# Patient Record
Sex: MALE | Race: WHITE | Employment: OTHER | URBAN - NONMETROPOLITAN AREA
[De-identification: names, ages, dates, MRNs, and addresses within clinical notes are randomized per-mention and may not be internally consistent; named-entity substitution may affect disease eponyms.]

---

## 2018-04-03 ENCOUNTER — HOSPITAL ENCOUNTER (INPATIENT)
Facility: HOSPITAL | Age: 38
LOS: 6 days | Discharge: HOME | End: 2018-04-09
Attending: PHYSICIAN ASSISTANT | Admitting: PSYCHIATRY & NEUROLOGY
Payer: MEDICAID

## 2018-04-03 DIAGNOSIS — F41.9 ANXIETY: ICD-10-CM

## 2018-04-03 DIAGNOSIS — R45.851 SUICIDAL IDEATION: ICD-10-CM

## 2018-04-03 DIAGNOSIS — G47.9 SLEEP DISORDER: Primary | ICD-10-CM

## 2018-04-03 DIAGNOSIS — F17.200 NICOTINE DEPENDENCE, UNCOMPLICATED, UNSPECIFIED NICOTINE PRODUCT TYPE: ICD-10-CM

## 2018-04-03 LAB
ALBUMIN SERPL-MCNC: 4.1 G/DL (ref 3.4–5)
ALP SERPL-CCNC: 99 U/L (ref 40–150)
ALT SERPL W P-5'-P-CCNC: 73 U/L (ref 0–70)
ANION GAP SERPL CALCULATED.3IONS-SCNC: 10 MMOL/L (ref 3–14)
APAP SERPL-MCNC: <2 MG/L (ref 10–20)
AST SERPL W P-5'-P-CCNC: 56 U/L (ref 0–45)
BILIRUB SERPL-MCNC: 0.6 MG/DL (ref 0.2–1.3)
BUN SERPL-MCNC: 9 MG/DL (ref 7–30)
CALCIUM SERPL-MCNC: 9.1 MG/DL (ref 8.5–10.1)
CHLORIDE SERPL-SCNC: 103 MMOL/L (ref 94–109)
CO2 SERPL-SCNC: 25 MMOL/L (ref 20–32)
CREAT SERPL-MCNC: 0.78 MG/DL (ref 0.66–1.25)
ETHANOL SERPL-MCNC: <0.01 G/DL
GFR SERPL CREATININE-BSD FRML MDRD: >90 ML/MIN/1.7M2
GLUCOSE SERPL-MCNC: 173 MG/DL (ref 70–99)
LITHIUM SERPL-SCNC: <0.2 MMOL/L (ref 0.6–1.2)
POTASSIUM SERPL-SCNC: 3.8 MMOL/L (ref 3.4–5.3)
PROT SERPL-MCNC: 8.6 G/DL (ref 6.8–8.8)
SALICYLATES SERPL-MCNC: 3 MG/DL
SODIUM SERPL-SCNC: 138 MMOL/L (ref 133–144)

## 2018-04-03 PROCEDURE — 80178 ASSAY OF LITHIUM: CPT | Performed by: PHYSICIAN ASSISTANT

## 2018-04-03 PROCEDURE — 80329 ANALGESICS NON-OPIOID 1 OR 2: CPT | Performed by: PHYSICIAN ASSISTANT

## 2018-04-03 PROCEDURE — 99285 EMERGENCY DEPT VISIT HI MDM: CPT

## 2018-04-03 PROCEDURE — 80320 DRUG SCREEN QUANTALCOHOLS: CPT | Performed by: PHYSICIAN ASSISTANT

## 2018-04-03 PROCEDURE — 99284 EMERGENCY DEPT VISIT MOD MDM: CPT | Mod: Z6 | Performed by: PHYSICIAN ASSISTANT

## 2018-04-03 PROCEDURE — 12400000 ZZH R&B MH

## 2018-04-03 PROCEDURE — 80053 COMPREHEN METABOLIC PANEL: CPT | Performed by: PHYSICIAN ASSISTANT

## 2018-04-03 PROCEDURE — 25000132 ZZH RX MED GY IP 250 OP 250 PS 637: Performed by: NURSE PRACTITIONER

## 2018-04-03 PROCEDURE — 36415 COLL VENOUS BLD VENIPUNCTURE: CPT | Performed by: PHYSICIAN ASSISTANT

## 2018-04-03 RX ORDER — PROMETHAZINE HYDROCHLORIDE 25 MG/1
25 TABLET ORAL EVERY 6 HOURS PRN
Status: ON HOLD | COMMUNITY
End: 2018-04-29

## 2018-04-03 RX ORDER — LORATADINE 10 MG/1
10 TABLET ORAL DAILY
Status: ON HOLD | COMMUNITY
End: 2018-04-29

## 2018-04-03 RX ORDER — DIVALPROEX SODIUM 500 MG/1
1000 TABLET, EXTENDED RELEASE ORAL AT BEDTIME
Status: ON HOLD | COMMUNITY
End: 2018-04-09

## 2018-04-03 RX ORDER — OLANZAPINE 10 MG/1
10 TABLET ORAL 3 TIMES DAILY PRN
Status: DISCONTINUED | OUTPATIENT
Start: 2018-04-03 | End: 2018-04-09 | Stop reason: HOSPADM

## 2018-04-03 RX ORDER — FLUTICASONE PROPIONATE 50 MCG
1 SPRAY, SUSPENSION (ML) NASAL
Status: ON HOLD | COMMUNITY
End: 2018-04-29

## 2018-04-03 RX ORDER — ACETAMINOPHEN 325 MG/1
650 TABLET ORAL EVERY 4 HOURS PRN
Status: DISCONTINUED | OUTPATIENT
Start: 2018-04-03 | End: 2018-04-09 | Stop reason: HOSPADM

## 2018-04-03 RX ORDER — HYDROXYZINE HYDROCHLORIDE 25 MG/1
25-50 TABLET, FILM COATED ORAL EVERY 4 HOURS PRN
Status: DISCONTINUED | OUTPATIENT
Start: 2018-04-03 | End: 2018-04-09 | Stop reason: HOSPADM

## 2018-04-03 RX ORDER — ALUMINA, MAGNESIA, AND SIMETHICONE 2400; 2400; 240 MG/30ML; MG/30ML; MG/30ML
30 SUSPENSION ORAL EVERY 4 HOURS PRN
Status: DISCONTINUED | OUTPATIENT
Start: 2018-04-03 | End: 2018-04-09 | Stop reason: HOSPADM

## 2018-04-03 RX ORDER — LORAZEPAM 0.5 MG/1
0.5 TABLET ORAL 2 TIMES DAILY PRN
Status: ON HOLD | COMMUNITY
End: 2018-04-09

## 2018-04-03 RX ORDER — TRAZODONE HYDROCHLORIDE 50 MG/1
50 TABLET, FILM COATED ORAL
Status: DISCONTINUED | OUTPATIENT
Start: 2018-04-03 | End: 2018-04-09 | Stop reason: HOSPADM

## 2018-04-03 RX ORDER — OLANZAPINE 10 MG/2ML
10 INJECTION, POWDER, FOR SOLUTION INTRAMUSCULAR 3 TIMES DAILY PRN
Status: DISCONTINUED | OUTPATIENT
Start: 2018-04-03 | End: 2018-04-09 | Stop reason: HOSPADM

## 2018-04-03 RX ADMIN — ALUMINUM HYDROXIDE, MAGNESIUM HYDROXIDE, AND DIMETHICONE 30 ML: 400; 400; 40 SUSPENSION ORAL at 17:55

## 2018-04-03 RX ADMIN — OLANZAPINE 10 MG: 10 TABLET, FILM COATED ORAL at 17:55

## 2018-04-03 RX ADMIN — ACETAMINOPHEN 650 MG: 325 TABLET, FILM COATED ORAL at 17:55

## 2018-04-03 ASSESSMENT — ENCOUNTER SYMPTOMS
RESPIRATORY NEGATIVE: 1
CARDIOVASCULAR NEGATIVE: 1
CONSTITUTIONAL NEGATIVE: 1
NEUROLOGICAL NEGATIVE: 1
SLEEP DISTURBANCE: 1

## 2018-04-03 ASSESSMENT — ACTIVITIES OF DAILY LIVING (ADL)
TRANSFERRING: 0-->INDEPENDENT
RETIRED_EATING: 0-->INDEPENDENT
COGNITION: 1 - ATTENTION OR MEMORY DEFICITS
RETIRED_COMMUNICATION: 0-->UNDERSTANDS/COMMUNICATES WITHOUT DIFFICULTY
NUMBER_OF_TIMES_PATIENT_HAS_FALLEN_WITHIN_LAST_SIX_MONTHS: 2
AMBULATION: 0-->INDEPENDENT
BATHING: 0-->INDEPENDENT
TOILETING: 0-->INDEPENDENT
FALL_HISTORY_WITHIN_LAST_SIX_MONTHS: YES
DRESS: 0-->INDEPENDENT
SWALLOWING: 0-->SWALLOWS FOODS/LIQUIDS WITHOUT DIFFICULTY
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
## 2018-04-03 NOTE — ED NOTES
Patient cooperative, changed into scrubs clothing given to security and placed in locker.  Patient consents to safety Patients dad at bedside  Security at bedside
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
## 2018-04-03 NOTE — IP AVS SNAPSHOT
[facility]    [address] [place] [id number]    Phone:  [phone]    Fax:  [phone]                                       After Visit Summary   4/3/2018    Ar Irby III    MRN: [id number]           After Visit Summary Signature Page     I have received my discharge instructions, and my questions have been answered. I have discussed any challenges I see with this plan with the nurse or doctor.    ..........................................................................................................................................  Patient/Patient Representative Signature      ..........................................................................................................................................  Patient Representative Print Name and Relationship to Patient    ..................................................               ................................................  Date                                            Time    ..........................................................................................................................................  Reviewed by Signature/Title    ...................................................              ..............................................  Date                                                            Time

## 2018-04-03 NOTE — IP AVS SNAPSHOT
` `       Patient Information     Patient Name Sex     Ar Irby III ([id number]) Male 1980       Room Bed    568 568-1      Patient Demographics     Address Phone    [address]   [place] [address] [phone] (Home)      Patient Ethnicity & Race     Ethnic Group Patient Race    American White      Emergency Contact(s)     Name Relation Home Work Mobile    AR IRBY JR Father [phone]      NO SECONDARY CONTACT Other NONE        Documents on File        Status Date Received Description       Documents for the Patient    Insurance Card Not Received  DIDN'T HAVE    Patient ID   DIDN'T HAVE    Consent for EHR Access-Received-ESign       Consent for EHR Access-Decline-ESign       Consent for Services/Privacy Notice - Hospital/Clinic Received 18     Privacy Notice - Rule Received 18     Care Everywhere Prospective Auth Received 18     Consent for EHR Access Received 18        Documents for the Encounter    ED Notes - Emergency Department  18 DEC CRISIS ASSESSMENT    CMS IM for Patient Signature Received 18 Pt signed medicare letter    Business/Insurance/Care Coordination/Health Form - Encounter  18 EMERGENCY DEPARTMENT PATIENT WATCH      Admission Information     Attending Provider Admitting Provider Admission Type Admission Date/Time     Juvencio Howard MD Emergency 18  1445    Discharge Date Hospital Service Auth/Cert Status Service Area    18 Mental Health Incomplete [facility]    Unit Room/Bed Admission Status       [facility] 568/568-1 Discharged (Confirmed)       Admission     Complaint    Suicidal ideation      Hospital Account     Name Acct ID Class Status Primary Coverage    Ar Irby III [id number] Mental Health Discharged/[place]            Guarantor [place] (for Hospital Account #[id number])     Name Relation to Pt Service Area Active? Acct Type     Ar Irby III Self [facility]    Address Phone          [address] [place] [address] [phone](H)              Coverage Information (for Hospital Account #[id number])     F/O Payor/Plan Precert #    [place]     Subscriber Subscriber #    Ar Irby III [id number]    Address Phone    [address] [address]  [place] [address] [phone]

## 2018-04-03 NOTE — IP AVS SNAPSHOT
` `           [facility]: [phone]            Medication Administration Report for Ar Irby III as of 04/10/18 0953   Legend:    Given Hold Not Given Due Canceled Entry Other Actions    Time Time (Time) Time  Time-Action       Inactive    Active    Linked        Medications 04/03/18 04/04/18 04/05/18 04/06/18 04/07/18 04/08/18 04/09/18   Discontinued Medications      Dose: 650 mg  Freq: EVERY 4 HOURS PRN Route: PO  PRN Reason: mild pain  Start: 04/03/18 1748   End: 04/09/18 1708   Admin Instructions: Do not use if the patient has significant liver disease. MAX acetaminophen = 4000 mg/24 hrs.  MAX acetaminophen <3000 mg/24 hrs for patients > or = 65 years old.  Maximum acetaminophen dose from all sources = 75 mg/kg/day not to exceed 4 grams/day.    Admin. Amount: 2 tablet (2 × 325 mg tablet)  Last Admin: 04/09/18 1420  Dispense Loc: [facility] [id number]     1755 (650 mg)-Given        2223 (650 mg)-Given         0624 (650 mg)-Given       1625 (650 mg)-Given          1420 (650 mg)-Given       1708-Med Discontinued         Dose: 2 puff  Freq: EVERY 4 HOURS PRN Route: IN  PRN Reasons: wheezing,shortness of breath / dyspnea  Start: 04/04/18 1233   End: 04/09/18 1708   Admin. Amount: 2 puff  Dispense Loc: [facility]           1708-Med Discontinued         Dose: 30 mL  Freq: EVERY 4 HOURS PRN Route: PO  PRN Reason: indigestion  Start: 04/03/18 1748   End: 04/09/18 1708   Admin Instructions: Shake well.    Admin. Amount: 30 mL  Last Admin: 04/03/18 1755  Dispense [facility]  Volume: 30 mL     1755 (30 mL)-Given             1708-Med Discontinued         Dose: 150 mg  Freq: DAILY Route: PO  Start: 04/04/18 1245   End: 04/09/18 1708   Admin. Amount: 1 tablet (1 × 150 mg tablet)  Last Admin: 04/09/18 0819  Dispense Loc: [facility] [id number]      1318 (150 mg)-Given        0811 (150 mg)-Given        0818 (150 mg)-Given        0913 (150 mg)-Given        0857 (150 mg)-Given        0819 (150  mg)-Given       1708-Med Discontinued         Dose: 1 spray  Freq: DAILY Route: BOTH NOSTRIL  Start: 04/04/18 1245   End: 04/09/18 1708   Admin. Amount: 1 spray  Last Admin: 04/08/18 0906  Dispense Loc: [facility]      1317 (1 spray)-Given        (0810)-Not Given        (0817)-Not Given        (0917)-Not Given        0906 (1 spray)-Given        (0819)-Not Given       1708-Med Discontinued         Dose: 25-50 mg  Freq: EVERY 4 HOURS PRN Route: PO  PRN Reason: anxiety  Start: 04/03/18 1748   End: 04/09/18 1708   Admin. Amount: 1-2 tablet (1-2 × 25 mg tablet)  Last Admin: 04/09/18 1107  Dispense Loc: [facility] [id number]      1854 (25 mg)-Given         0821 (50 mg)-Given          1107 (50 mg)-Given       1708-Med Discontinued         Dose: 10 mg  Freq: DAILY Route: PO  Start: 04/04/18 1245   End: 04/09/18 1708   Admin. Amount: 1 tablet (1 × 10 mg tablet)  Last Admin: 04/09/18 0819  Dispense Loc: [facility] [id number]      1318 (10 mg)-Given        0811 (10 mg)-Given        0818 (10 mg)-Given        0914 (10 mg)-Given        0857 (10 mg)-Given        0819 (10 mg)-Given       1708-Med Discontinued         Dose: 30 mL  Freq: AT BEDTIME PRN Route: PO  PRN Reason: constipation  Start: 04/03/18 1748   End: 04/09/18 1708   Admin Instructions: Shake well.    Admin. Amount: 30 mL  Dispense Loc: [facility]  Volume: 30 mL           1708-Med Discontinued         Dose: 9 mg  Freq: AT BEDTIME PRN Route: PO  PRN Reason: sleep  Start: 04/04/18 1236   End: 04/09/18 1708   Admin. Amount: 3 tablet (3 × 3 mg tablet)  Dispense Loc: [facility] [id number]           1708-Med Discontinued         Dose: 1 patch  Freq: EVERY 24 HOURS Route: TD  Start: 04/04/18 1245   End: 04/09/18 1708   Admin. Amount: 1 patch  Last Admin: 04/04/18 1317  [id number]      1317 (1 patch)-Given        (0810)-Not Given        (0817)-Not Given        (0918)-Not Given [C]        (0857)-Not Given        (0819)-Not Given       1708-Med Discontinued          Freq: EVERY 8 HOURS Route: TD  Start: 04/04/18 1245   End: 04/09/18 1708   Admin Instructions: Chart every shift, confirming that patch is still in place on patient (no barcode scan needed). See patch order for dose information.    Last Admin: 04/09/18 1202  Dispense Loc: [facility]      1319 ( )-Patch in Place       2225 (1 each)-Patch Removed [C]        0358 ( )-Negative       1209 ( )-Negative               0405 ( )-Negative [C]       1152 ( )-Negative       2040 ( )-Patch in Place        0400-Hold       1205-Hold [C]       2036 ( )-Patch in Place        0431 ( )-Negative       1405 ( )-Patch in Place       1947 ( )-Patch in Place        0358 ( )-Patch in Place       1202 ( )-Negative       1708-Med Discontinued         Freq: DAILY Route: TD  Start: 04/05/18 0900   End: 04/09/18 1708   Admin Instructions: Remove patch when new patch is applied or patch is discontinued.    Dispense Loc: [facility]       0812 ( )-Patch Removed        0816 ( )-Patch Removed        0919-Hold [C]        0902 ( )-Patch Removed        0826-Hold       1708-Med Discontinued         Dose: 2-4 mg  Freq: EVERY 1 HOUR PRN Route: BU  PRN Reason: other  PRN Comment: nicotine withdrawal symptoms  Start: 04/03/18 1748   End: 04/09/18 1708   Admin Instructions: Chew until tingling, then place between cheek and gum.    Repeat.    Do not swallow.    Not to exceed 48 mg in a 24 hour time period.    Admin. Amount: 1-2 each (1-2 × 2 mg each)  Dispense Loc: [facility] [id number]           1708-Med Discontinued         Dose: 10 mg  Freq: 3 TIMES DAILY PRN Route: PO  PRN Reason: agitation  PRN Comment: associated with psychosis or peace  Start: 04/03/18 1748   End: 04/09/18 1708   Admin Instructions: Consider lower dose if sedation or hypotension.  Combined IM and PO doses may significantly increase the risk of orthostatic hypotension at 30 mg per day or higher.    Admin. Amount: 1 tablet (1 × 10 mg tablet)  Last Admin: 04/09/18 1405  Dispense Loc:  [facility] [id number]     1755 (10 mg)-Given           1520 (10 mg)-Given [C]               0743 (10 mg)-Given       1617 (10 mg)-Given        0819 (10 mg)-Given       1405 (10 mg)-Given       1708-Med Discontinued      Or    Dose: 10 mg  Freq: 3 TIMES DAILY PRN Route: IM  PRN Reason: agitation  PRN Comment: associated with psychosis or peace  Start: 04/03/18 1748   End: 04/09/18 1708   Admin Instructions: Not to exceed 30 mg in 24 hours.  Consider lower dose if sedation or hypotension.  Dissolve the contents of the 10 mg vial using 2.1 mL of Sterile Water for Injection to provide a solution containing 5 mg/mL of olanzapine. Withdraw the ordered dose from vial. Use immediately (within 1 hour) after reconstitution. Discard any unused portion.    Admin. Amount: 10 mg  Last Admin: 04/08/18 0104  Dispense Loc: [facility] [id number]                        0104 (10 mg)-Given                                    1708-Med Discontinued         Dose: 40 mg  Freq: EVERY MORNING BEFORE BREAKFAST Route: PO  Start: 04/05/18 0730   End: 04/09/18 1708   Admin. Amount: 1 tablet (1 × 40 mg tablet)  Last Admin: 04/09/18 0637  Dispense Loc: [facility] [id number]       0702 (40 mg)-Given [C]        0624 (40 mg)-Given               0652 (40 mg)-Given        (0632)-Not Given        0637 (40 mg)-Given       1708-Med Discontinued         Dose: 25 mg  Freq: EVERY 6 HOURS PRN Route: PO  PRN Reason: nausea  Start: 04/04/18 1234   End: 04/09/18 1708   Admin. Amount: 1 tablet (1 × 25 mg tablet)  Dispense Loc: [facility] [id number]           1708-Med Discontinued         Dose: 300 mg  Freq: AT BEDTIME Route: PO  Start: 04/04/18 2100   End: 04/07/18 1511   Admin. Amount: 1 tablet (1 × 100 mg tablet) + 1 tablet (1 × 200 mg tablet)  Last Admin: 04/06/18 2151  Dispense Loc: [facility] [id number]   Mixture Administration Information:   Medication Type Amount   QUEtiapine 100 MG TABS Medications 100 mg   QUEtiapine 200 MG TABS Medications 200 mg              2222 (300 mg)-Given [C]        2211 (024  mg)-Given        2151 (300 mg)-Given        1511-Med Discontinued           Dose: 800 mg  Freq: AT BEDTIME Route: PO  Start: 04/07/18 2100   End: 04/09/18 1708   Admin. Amount: 4 tablet (4 × 200 mg tablet)  Last Admin: 04/08/18 2056  Dispense Loc: [facility] [id number]         2035 (800 mg)-Given        2056 (800 mg)-Given        1708-Med Discontinued         Dose: 50 mg  Freq: AT BEDTIME PRN Route: PO  PRN Reason: sleep  Start: 04/03/18 1748   End: 04/09/18 1708   Admin Instructions: May repeat x 1    Admin. Amount: 1 tablet (1 × 50 mg tablet)  Last Admin: 04/06/18 0243  Dispense Loc: [facility] [id number]      0053 (50 mg)-Given [C]         0243 (50 mg)-Given          1708-Med Discontinued         Dose: 5 mg  Freq: AT ONSET OF HEADACHE Route: PO  PRN Reason: migraine  Start: 04/07/18 1338   End: 04/09/18 1708   Admin. Amount: 1 tablet (1 × 5 mg tablet)  Last Admin: 04/08/18 1455  Dispense Loc: [facility]         1356 (5 mg)-Given        1455 (5 mg)-Given        1708-Med Discontinued         Dose: 5 mg  Freq: AT ONSET OF HEADACHE Route: PO  PRN Reason: migraine  Start: 04/07/18 1326   End: 04/07/18 1338   Admin. Amount: 1 tablet (1 × 5 mg tablet)         1338-Med Discontinued      Medications 04/03/18 04/04/18 04/05/18 04/06/18 04/07/18 04/08/18 04/09/18

## 2018-04-03 NOTE — IP AVS SNAPSHOT
STOP!!! DO NOT PRINT OR REFERENCE THIS AVS!!!  AVS displayed here is NOT the version that was given to the patient at discharge.  GO TO CHART REVIEW to print or review a copy of the AVS that was frozen/printed at time of discharge.                           MRN:[id number]                      After Visit Summary   4/3/2018    Ar Irby III    MRN: [id number]           Thank you!     Thank you for choosing [facility] for your care. Our goal is always to provide you with excellent care. Hearing back from our patients is one way we can continue to improve our services. Please take a few minutes to complete the written survey that you may receive in the mail after you visit with us. Thank you!        Patient Information     Date Of Birth          1980        Designated Caregiver       Most Recent Value    Caregiver    Will someone help with your care after discharge? no      About your hospital stay     You were admitted on:  April 3, 2018 You last received care in the: [facility]    You were discharged on:  April 9, 2018       Who to Call     For medical emergencies, please call 911.  For non-urgent questions about your medical care, please call your primary care provider or clinic, None          Attending Provider     Provider Specialty    Dennis Ivy PA Physician Assistant - Medical    Juvencio Howard MD Psychiatry    Xena Rowell, NP Nurse Practitioner       Primary Care Provider Fax #    Physician No Ref-Primary [phone]      Your next 10 appointments already scheduled     Apr 17, 2018 11:00 AM CDT   (Arrive by 10:45 AM)   Office Visit with Kathy Anderson MD   [facility] ([facility] )    [address] [place] [address]   [phone]           Bring a current list of meds and any records pertaining to this visit. For Physicals, please bring immunization records and any forms needing to be filled out. Please arrive 10 minutes early to  complete paperwork.            Apr 18, 2018  4:00 PM CDT   (Arrive by 3:45 PM)   New Visit with TOMEKA Ponce [facility] ([facility] )    [address] [place] [id number] [phone]              Further instructions from your care team       Behavioral Discharge Planning and Instructions    Summary: Ar was admitted to the [address] [place] [address] for suicidal ideation    Main Diagnosis: Schizoaffective disorder- bipolar type    Major Treatments, Procedures and Findings: Stabilize with medications, connect with community programs.    Symptoms to Report: feeling more aggressive, increased confusion, losing more sleep, mood getting worse or thoughts of suicide    Lifestyle Adjustment: Take all medications as prescribed, meet with doctor/ medication provider, out patient therapist, , and [facility] worker as scheduled. Abstain from alcohol or any unprescribed drugs.      Psychiatry Follow-up:     [facility]: Medication Management: Melchor Sandoval NP Wednesday, April 18th @ 3:45pm  PCP: Jessica Dye April 17th @ 10:00am  [address] [place], [place] [address], [place] [address]  Phone: [phone]  Fax: [phone]    [facility]: Referral 4/5/18  [address] [place] [address]  Phone: [phone]  Fax: [phone]    [facility]: Referral sent 4/5/18  [address] [place] [address]  Phone: [phone]  Fax: [phone]    [facility]: referral for case management sent 4/5/18  [address] [facility] [address] [place] [address]  Phone:  [phone]   Fax - [phone]    [facility]: Call or go to if you are in crisis  [address] [place] [place]  [address]  Phone - [phone]  Fax - [phone]      Resources:   Crisis Intervention: 886.619.2875 or 676-734-0301 (TTY: 241.576.7812).  Call anytime for help.  National [place] on Mental Illness (www.[place].nagi.org): 112.378.4255 or  487.203.9810.  Alcoholics Anonymous (www.alcoholics-anonymous.org): Check your phone book for your local chapter.  Suicide Awareness Voices of Education (SAVE) (www.save.org): 021-616-FIQR (0270)  National Suicide Prevention Line (www.mentalhealthmn.org): 458-580-QRWH (7898)  Mental Health Consumer/Survivor Network of [place] (www.mhcsn.net): 291.429.2084 or 254-153-2436  Mental Health Association of [place] (www.mentalhealth.org): 816.705.2206 or 930-404-6009    General Medication Instructions:   See your medication sheet(s) for instructions.   Take all medicines as directed.  Make no changes unless your doctor suggests them.   Go to all your doctor visits.  Be sure to have all your required lab tests. This way, your medicines can be refilled on time.  Do not use any drugs not prescribed by your doctor.  Avoid alcohol.    [place] Area:  Major Hospital, Crisis stabilization [facility]- [phone]  [facility] Crisis Line: 1-576.108.6405  Advocates For Family Peace: 851-1216  Sexual Assault Program [facility]: 720.396.8871 or 1-925.132.9877  [facility] Battered Women's Program: 1-214.718.6278 Ext: 279       Calls answered Mon-Fri-8:00 am--4:30 pm    [place]:  Advocates for Family Peace: 1-578.841.4669  [facility] first call for help: 0-657-070-8000  [facility] Crisis [facility]:  (622) 607-6281      [place] Area:  Warm Line: 1-598.859.9633       Calls answered Tuesday--Saturday 4:00 pm--10:00 pm  [facility] Crisis Line - [phone]  [place] [place] Crisis Stabilization 012-436-9359    [place] Statewide:  [place] Crisis and Referral Services: 1-828.129.9188  National Suicide Prevention Lifeline: 3-215-031-TALK (1919)   - lxn0vjus- Text  Life  to 10237  First Call for Help: 2-1-1  [facility] Helpline- 3-685-LSFG-HELP      Pending Results     No orders found from 4/1/2018 to 4/4/2018.            Statement of Approval     Ordered          04/09/18 1128  I have reviewed and agree with all the recommendations and orders detailed in  "this document.  EFFECTIVE NOW     Approved and electronically signed by:  Kiel Blair NP             Admission Information     Date & Time Department Dept. Phone    4/3/2018 [facility] [phone]      Your Vitals Were     Blood Pressure Pulse Temperature Respirations Height Weight    125/84 105 96.9  F (36.1  C) (Tympanic) 20 1.778 m (5' 10\") 106 kg (233 lb 9.6 oz)    Pulse Oximetry BMI (Body Mass Index)                95% 33.52 kg/m2          MyChart Information     [facility] lets you send messages to your doctor, view your test results, renew your prescriptions, schedule appointments and more. To sign up, go to [email or web address] . Click on \"Log in\" on the left side of the screen, which will take you to the Welcome page. Then click on \"Sign up Now\" on the right side of the page.     You will be asked to enter the access code listed below, as well as some personal information. Please follow the directions to create your username and password.     Your access code is: [email or web address]  Expires: 2018 12:02 PM     Your access code will  in 90 days. If you need help or a new code, please call your [facility] clinic or [phone].        Care EveryWhere ID     This is your Care EveryWhere ID. This could be used by other organizations to access your [facility] medical records  [id number]        Equal Access to Services     [facility] Center[facility] : Hadii aad ku hadasho Sogeminiali, waaxda luqadaha, qaybta kaalmada adeegyada, rolando izquierdo . So [facility] [phone].    ATENCIÓN: Si habla español, tiene a glaser disposición servicios gratuitos de asistencia lingüística. Llstephen al [phone].    We comply with applicable federal civil rights laws and [place] laws. We do not discriminate on the basis of race, color, national origin, age, disability, sex, sexual orientation, or gender identity.               Review of your medicines      START taking        Dose / Directions    hydrOXYzine " 25 MG tablet   Commonly known as:  ATARAX   Used for:  Anxiety        Dose:  25-50 mg   Take 1-2 tablets (25-50 mg) by mouth every 4 hours as needed for anxiety   Quantity:  120 tablet   Refills:  0       melatonin 3 MG tablet   Used for:  Sleep disorder        Dose:  9 mg   Take 3 tablets (9 mg) by mouth nightly as needed for sleep   Quantity:  90 tablet   Refills:  0         CONTINUE these medicines which may have CHANGED, or have new prescriptions. If we are uncertain of the size of tablets/capsules you have at home, strength may be listed as something that might have changed.        Dose / Directions    buPROPion 150 MG 24 hr tablet   Commonly known as:  WELLBUTRIN XL   This may have changed:    - medication strength  - how much to take   Used for:  Suicidal ideation        Dose:  150 mg   Take 1 tablet (150 mg) by mouth daily   Quantity:  30 tablet   Refills:  0       QUEtiapine 400 MG tablet   Commonly known as:  SEROquel   This may have changed:  medication strength   Used for:  Suicidal ideation        Dose:  800 mg   Take 2 tablets (800 mg) by mouth At Bedtime   Quantity:  60 tablet   Refills:  0         CONTINUE these medicines which have NOT CHANGED        Dose / Directions    fluticasone 50 MCG/ACT spray   Commonly known as:  FLONASE        Dose:  1 spray   1 spray   Refills:  0       loratadine 10 MG tablet   Commonly known as:  CLARITIN        Dose:  10 mg   Take 10 mg by mouth daily   Refills:  0       nicotine 21 MG/24HR 24 hr patch   Commonly known as:  NICODERM CQ   Used for:  Nicotine dependence, uncomplicated, unspecified nicotine product type        Dose:  1 patch   Place 1 patch onto the skin every 24 hours   Quantity:  28 patch   Refills:  0       promethazine 25 MG tablet   Commonly known as:  PHENERGAN        Dose:  25 mg   Take 25 mg by mouth every 6 hours as needed for nausea   Refills:  0       PROTONIX PO        Dose:  40 mg   Take 40 mg by mouth   Refills:  0       VENTOLIN  (90  BASE) MCG/ACT Inhaler   Generic drug:  albuterol        Dose:  2 puff   Inhale 2 puffs into the lungs Every 4-6 hours as needed   Refills:  0         STOP taking     divalproex sodium extended-release 500 MG 24 hr tablet   Commonly known as:  DEPAKOTE ER           LORazepam 0.5 MG tablet   Commonly known as:  ATIVAN                Where to get your medicines      These medications were sent to [facility] [address] [place], [place] - [address]  [address] [address]     Phone:  [phone]     buPROPion 150 MG 24 hr tablet    hydrOXYzine 25 MG tablet    melatonin 3 MG tablet    nicotine 21 MG/24HR 24 hr patch    QUEtiapine 400 MG tablet                Protect others around you: Learn how to safely use, store and throw away your medicines at www.disposemymeds.org.             Medication List: This is a list of all your medications and when to take them. Check marks below indicate your daily home schedule. Keep this list as a reference.      Medications           Morning Afternoon Evening Bedtime As Needed    buPROPion 150 MG 24 hr tablet   Commonly known as:  WELLBUTRIN XL   Take 1 tablet (150 mg) by mouth daily   Last time this was given:  150 mg on 4/9/2018  8:19 AM                                fluticasone 50 MCG/ACT spray   Commonly known as:  FLONASE   1 spray   Last time this was given:  1 spray on 4/8/2018  9:06 AM                                hydrOXYzine 25 MG tablet   Commonly known as:  ATARAX   Take 1-2 tablets (25-50 mg) by mouth every 4 hours as needed for anxiety   Last time this was given:  50 mg on 4/9/2018 11:07 AM                                loratadine 10 MG tablet   Commonly known as:  CLARITIN   Take 10 mg by mouth daily   Last time this was given:  10 mg on 4/9/2018  8:19 AM                                melatonin 3 MG tablet   Take 3 tablets (9 mg) by mouth nightly as needed for sleep                                nicotine 21 MG/24HR 24 hr patch   Commonly known  as:  NICODERM CQ   Place 1 patch onto the skin every 24 hours   Last time this was given:  1 patch on 4/4/2018  1:17 PM                                promethazine 25 MG tablet   Commonly known as:  PHENERGAN   Take 25 mg by mouth every 6 hours as needed for nausea                                PROTONIX PO   Take 40 mg by mouth   Last time this was given:  40 mg on 4/9/2018  6:37 AM                                QUEtiapine 400 MG tablet   Commonly known as:  SEROquel   Take 2 tablets (800 mg) by mouth At Bedtime   Last time this was given:  800 mg on 4/8/2018  8:56 PM                                VENTOLIN  (90 BASE) MCG/ACT Inhaler   Inhale 2 puffs into the lungs Every 4-6 hours as needed   Generic drug:  albuterol
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
## 2018-04-04 LAB
ALBUMIN UR-MCNC: NEGATIVE MG/DL
AMPHETAMINES UR QL SCN: NEGATIVE
APPEARANCE UR: CLEAR
BARBITURATES UR QL: NEGATIVE
BENZODIAZ UR QL: NEGATIVE
BILIRUB UR QL STRIP: NEGATIVE
CANNABINOIDS UR QL SCN: NEGATIVE
COCAINE UR QL: NEGATIVE
COLOR UR AUTO: ABNORMAL
GLUCOSE UR STRIP-MCNC: >1000 MG/DL
HGB UR QL STRIP: NEGATIVE
KETONES UR STRIP-MCNC: NEGATIVE MG/DL
LEUKOCYTE ESTERASE UR QL STRIP: NEGATIVE
METHADONE UR QL SCN: NEGATIVE
NITRATE UR QL: NEGATIVE
OPIATES UR QL SCN: NEGATIVE
PCP UR QL SCN: NEGATIVE
PH UR STRIP: 7.5 PH (ref 4.7–8)
SOURCE: ABNORMAL
SP GR UR STRIP: 1.02 (ref 1–1.03)
UROBILINOGEN UR STRIP-MCNC: NORMAL MG/DL (ref 0–2)

## 2018-04-04 PROCEDURE — 81003 URINALYSIS AUTO W/O SCOPE: CPT | Performed by: PHYSICIAN ASSISTANT

## 2018-04-04 PROCEDURE — 80307 DRUG TEST PRSMV CHEM ANLYZR: CPT | Performed by: PHYSICIAN ASSISTANT

## 2018-04-04 PROCEDURE — 25000132 ZZH RX MED GY IP 250 OP 250 PS 637: Performed by: NURSE PRACTITIONER

## 2018-04-04 PROCEDURE — 12400000 ZZH R&B MH

## 2018-04-04 RX ORDER — PANTOPRAZOLE SODIUM 40 MG/1
40 TABLET, DELAYED RELEASE ORAL
Status: DISCONTINUED | OUTPATIENT
Start: 2018-04-05 | End: 2018-04-09 | Stop reason: HOSPADM

## 2018-04-04 RX ORDER — LORATADINE 10 MG/1
10 TABLET ORAL DAILY
Status: DISCONTINUED | OUTPATIENT
Start: 2018-04-04 | End: 2018-04-09 | Stop reason: HOSPADM

## 2018-04-04 RX ORDER — PROMETHAZINE HYDROCHLORIDE 25 MG/1
25 TABLET ORAL EVERY 6 HOURS PRN
Status: DISCONTINUED | OUTPATIENT
Start: 2018-04-04 | End: 2018-04-09 | Stop reason: HOSPADM

## 2018-04-04 RX ORDER — NICOTINE 21 MG/24HR
1 PATCH, TRANSDERMAL 24 HOURS TRANSDERMAL EVERY 24 HOURS
Status: ON HOLD | COMMUNITY
End: 2018-04-09

## 2018-04-04 RX ORDER — BUPROPION HYDROCHLORIDE 150 MG/1
150 TABLET ORAL DAILY
Status: DISCONTINUED | OUTPATIENT
Start: 2018-04-04 | End: 2018-04-09 | Stop reason: HOSPADM

## 2018-04-04 RX ORDER — ALBUTEROL SULFATE 90 UG/1
2 AEROSOL, METERED RESPIRATORY (INHALATION) EVERY 4 HOURS PRN
Status: DISCONTINUED | OUTPATIENT
Start: 2018-04-04 | End: 2018-04-09 | Stop reason: HOSPADM

## 2018-04-04 RX ORDER — FLUTICASONE PROPIONATE 50 MCG
1 SPRAY, SUSPENSION (ML) NASAL DAILY
Status: DISCONTINUED | OUTPATIENT
Start: 2018-04-04 | End: 2018-04-09 | Stop reason: HOSPADM

## 2018-04-04 RX ORDER — LANOLIN ALCOHOL/MO/W.PET/CERES
9 CREAM (GRAM) TOPICAL
Status: DISCONTINUED | OUTPATIENT
Start: 2018-04-04 | End: 2018-04-09 | Stop reason: HOSPADM

## 2018-04-04 RX ORDER — NICOTINE 21 MG/24HR
1 PATCH, TRANSDERMAL 24 HOURS TRANSDERMAL EVERY 24 HOURS
Status: DISCONTINUED | OUTPATIENT
Start: 2018-04-04 | End: 2018-04-09 | Stop reason: HOSPADM

## 2018-04-04 RX ADMIN — ACETAMINOPHEN 650 MG: 325 TABLET, FILM COATED ORAL at 22:23

## 2018-04-04 RX ADMIN — HYDROXYZINE HYDROCHLORIDE 25 MG: 25 TABLET ORAL at 18:54

## 2018-04-04 RX ADMIN — QUETIAPINE 300 MG: 200 TABLET, FILM COATED ORAL at 22:22

## 2018-04-04 RX ADMIN — TRAZODONE HYDROCHLORIDE 50 MG: 50 TABLET ORAL at 00:53

## 2018-04-04 RX ADMIN — LORATADINE 10 MG: 10 TABLET ORAL at 13:18

## 2018-04-04 RX ADMIN — NICOTINE 1 PATCH: 21 PATCH, EXTENDED RELEASE TRANSDERMAL at 13:17

## 2018-04-04 RX ADMIN — BUPROPION HYDROCHLORIDE 150 MG: 150 TABLET, FILM COATED, EXTENDED RELEASE ORAL at 13:18

## 2018-04-04 RX ADMIN — FLUTICASONE PROPIONATE 1 SPRAY: 50 SPRAY, METERED NASAL at 13:17

## 2018-04-04 ASSESSMENT — ACTIVITIES OF DAILY LIVING (ADL)
ORAL_HYGIENE: INDEPENDENT
LAUNDRY: UNABLE TO COMPLETE
DRESS: SCRUBS (BEHAVIORAL HEALTH);INDEPENDENT
ORAL_HYGIENE: INDEPENDENT
DRESS: SCRUBS (BEHAVIORAL HEALTH)
GROOMING: INDEPENDENT
GROOMING: INDEPENDENT

## 2018-04-04 NOTE — PLAN OF CARE
Problem: Patient Care Overview  Goal: Team Discussion  Team Plan:   BEHAVIORAL TEAM DISCUSSION    Participants: Xena Rowell NP, Elena Fajardo NP, Florinda Julien [facility], Chikis LARSON, Sana Baird RN, Luis Garcia RN, Amrita Garcia [facility], India Arthur OT, Jackelin Wynn OT  Progress: new patient.   Continued Stay Criteria/Rationale: suicidal ideation, delusional  Medical/Physical: none known  Precautions:   Behavioral Orders   Procedures     Code 1 - Restrict to Unit     Routine Programming     As clinically indicated     Self Injury Precaution     Bathroom door to remain locked until after provider evaluation     Status 15     Every 15 minutes.     Plan: Provider to assess.   Rationale for change in precautions or plan: none

## 2018-04-04 NOTE — PROGRESS NOTES
04/04/18 1824   Patient Belongings   Did you bring any home meds/supplements to the hospital?  No   Patient Belongings other (see comments)   Disposition of Belongings Locker   Belongings Search Yes   Clothing Search Yes   Second Staff Gracy   General Info Comment Pili 2 in 1 Shampoo    List items sent to safe: NA    All other belongings put in assigned cubby in belongings room.     I have reviewed my belongings list on admission and verify that it is correct.     Patient signature_______________________________    Second staff witness (if patient unable to sign) ______________________________       I have received all my belongings at discharge.    Patient signature________________________________    Kierra   4/4/2018  6:25 PM

## 2018-04-04 NOTE — PLAN OF CARE
Problem: Patient Care Overview  Goal: Discharge Needs Assessment  Writer spoke with [facility] and learned pt had been committed through [facility] in January 2017.  He also had been committed through [facility] in 2012.  Writer called [facility] and learned he his commitment was dismissed in July 2017.

## 2018-04-04 NOTE — PLAN OF CARE
"Face to face end of shift report received from WERNER Bui. Rounding completed. Patient observed. Had been lying in his room on bed checks - is now requesting \"something to sleep\" relays he usually takes seroquel 800 mg - explained to him he does not have orders for previous meds - until the nurse practioner sees him tomorrow - has taken trazadone in the past and will administer that now - is having a snack at this time - juice, pudding and douglas crackers.  Then returned to bed.     Cristina Irby  4/4/2018  2:11 AM          "
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
## 2018-04-04 NOTE — PLAN OF CARE
Face to face end of shift report received from Sakshi CALDERA. Rounding completed. Patient observed.     Brissa Schroeder  4/4/2018  3:49 PM

## 2018-04-04 NOTE — PLAN OF CARE
Face to face end of shift report received from Cristina CALDERA. Rounding completed. Patient observed.     Sakshi Ayala  4/4/2018  7:30 AM

## 2018-04-04 NOTE — PLAN OF CARE
Problem: Patient Care Overview  Goal: Individualization & Mutuality  Patient will report managed anxiety and decreased depression prior to discharge.   Patient will report at least 6-8 hours of sleep each night.   Patient will be compliant with treatment team recommendations.    Outcome: No Change  Administered trazadone 50 mg po at 0053 and a snack - for c/o insomnia - was pleasant, polite and appropriate to staff.  Did fall asleep with the med and slept for approx 7 hours.

## 2018-04-04 NOTE — PROGRESS NOTES
18 1847   Patient Belongings   Did you bring any home meds/supplements to the hospital?  Yes   Disposition of meds  Pharmacy approved for patient use (see comment)   Patient Belongings body jewelry;money (see comment);necklace;cell phone/electronics;clothing;shoes;glasses;earings   Disposition of Belongings Sent to security per site process;Locker;Kept with patient   Belongings Search Yes   Clothing Search Yes   Second Staff Luke   General Info Comment 4 White Socks, 1 Pair of Tan Footies, Black Beanie, Black Shorts with Red Strip, IPhone , Black South Pole Jacket, Landon Cool Water Tomball, 19 Tatyana Cigarettes in box, 1 Pair Red/White/Black Jordans, Black and Silver Colored Cross Dog Tag Necklace, BIC Lighter, Brown Belt, Blue Jimmie, Multicolored Button Down, Black [address]ies Street T-Shirt, 2 Scratch Offs, Misc Papers, Peppermints, Silver Colored Pen, Earring in ear, Black Sunglasses on PT    List items sent to safe: White HTC with back that won't seal, [place] goodideazsy [employer] Mastercard, $46 in Bills, $2 in Quarters, [facility] [place] Card,  [place] ID Card, [place] ID Card, [employer] Mastercard  All other belongings put in assigned cubby in belongings room.     I have reviewed my belongings list on admission and verify that it is correct.     Patient signature_______________________________    Second staff witness (if patient unable to sign) ______________________________       I have received all my belongings at discharge.    Patient signature________________________________    Kierra   4/3/2018  7:01 PM
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
## 2018-04-05 LAB
BASOPHILS # BLD AUTO: 0 10E9/L (ref 0–0.2)
BASOPHILS NFR BLD AUTO: 0.5 %
DIFFERENTIAL METHOD BLD: ABNORMAL
EOSINOPHIL # BLD AUTO: 0.3 10E9/L (ref 0–0.7)
EOSINOPHIL NFR BLD AUTO: 3.4 %
ERYTHROCYTE [DISTWIDTH] IN BLOOD BY AUTOMATED COUNT: 13.2 % (ref 10–15)
FOLATE SERPL-MCNC: 13.1 NG/ML
HCT VFR BLD AUTO: 39 % (ref 40–53)
HGB BLD-MCNC: 13.6 G/DL (ref 13.3–17.7)
IMM GRANULOCYTES # BLD: 0.1 10E9/L (ref 0–0.4)
IMM GRANULOCYTES NFR BLD: 1.3 %
LYMPHOCYTES # BLD AUTO: 2.4 10E9/L (ref 0.8–5.3)
LYMPHOCYTES NFR BLD AUTO: 30.2 %
MCH RBC QN AUTO: 29.2 PG (ref 26.5–33)
MCHC RBC AUTO-ENTMCNC: 34.9 G/DL (ref 31.5–36.5)
MCV RBC AUTO: 84 FL (ref 78–100)
MONOCYTES # BLD AUTO: 0.5 10E9/L (ref 0–1.3)
MONOCYTES NFR BLD AUTO: 5.7 %
NEUTROPHILS # BLD AUTO: 4.6 10E9/L (ref 1.6–8.3)
NEUTROPHILS NFR BLD AUTO: 58.9 %
NRBC # BLD AUTO: 0 10*3/UL
NRBC BLD AUTO-RTO: 0 /100
PLATELET # BLD AUTO: 230 10E9/L (ref 150–450)
RBC # BLD AUTO: 4.65 10E12/L (ref 4.4–5.9)
TSH SERPL DL<=0.005 MIU/L-ACNC: 2.11 MU/L (ref 0.4–4)
VIT B12 SERPL-MCNC: 344 PG/ML (ref 193–986)
WBC # BLD AUTO: 7.8 10E9/L (ref 4–11)

## 2018-04-05 PROCEDURE — 82607 VITAMIN B-12: CPT | Performed by: NURSE PRACTITIONER

## 2018-04-05 PROCEDURE — 85025 COMPLETE CBC W/AUTO DIFF WBC: CPT | Performed by: NURSE PRACTITIONER

## 2018-04-05 PROCEDURE — 36415 COLL VENOUS BLD VENIPUNCTURE: CPT | Performed by: NURSE PRACTITIONER

## 2018-04-05 PROCEDURE — 82746 ASSAY OF FOLIC ACID SERUM: CPT | Performed by: NURSE PRACTITIONER

## 2018-04-05 PROCEDURE — 12400000 ZZH R&B MH

## 2018-04-05 PROCEDURE — 25000132 ZZH RX MED GY IP 250 OP 250 PS 637: Performed by: NURSE PRACTITIONER

## 2018-04-05 PROCEDURE — 84443 ASSAY THYROID STIM HORMONE: CPT | Performed by: NURSE PRACTITIONER

## 2018-04-05 PROCEDURE — 82306 VITAMIN D 25 HYDROXY: CPT | Performed by: NURSE PRACTITIONER

## 2018-04-05 RX ADMIN — PANTOPRAZOLE SODIUM 40 MG: 40 TABLET, DELAYED RELEASE ORAL at 07:02

## 2018-04-05 RX ADMIN — LORATADINE 10 MG: 10 TABLET ORAL at 08:11

## 2018-04-05 RX ADMIN — QUETIAPINE 300 MG: 200 TABLET, FILM COATED ORAL at 22:11

## 2018-04-05 RX ADMIN — BUPROPION HYDROCHLORIDE 150 MG: 150 TABLET, FILM COATED, EXTENDED RELEASE ORAL at 08:11

## 2018-04-05 ASSESSMENT — ACTIVITIES OF DAILY LIVING (ADL)
GROOMING: INDEPENDENT
LAUNDRY: UNABLE TO COMPLETE
DRESS: SCRUBS (BEHAVIORAL HEALTH)
ORAL_HYGIENE: INDEPENDENT
DRESS: SCRUBS (BEHAVIORAL HEALTH);INDEPENDENT
GROOMING: INDEPENDENT
ORAL_HYGIENE: INDEPENDENT

## 2018-04-05 NOTE — PLAN OF CARE
Problem: Patient Care Overview  Goal: Individualization & Mutuality  Patient will report managed anxiety and decreased depression prior to discharge.   Patient will report at least 6-8 hours of sleep each night.   Patient will be compliant with treatment team recommendations.    Outcome: Improving  Slept for approx. 6 hours in his room and sitting up in the lounge - did have snacks twice - no elopement issues noted - has been visiting appropriately with myself and peers

## 2018-04-05 NOTE — PLAN OF CARE
Problem: Patient Care Overview  Goal: Discharge Needs Assessment  Writer faxed referral to [facility] I[facility] for the pt and left a voicemail for the treatment director.

## 2018-04-05 NOTE — PLAN OF CARE
Face to face end of shift report received from Sakshi CALDERA. Rounding completed. Patient observed.     Rachel Castellanos  4/5/2018  3:35 PM

## 2018-04-05 NOTE — PLAN OF CARE
Face to face end of shift report received from Cristina CALDERA. Rounding completed. Patient observed.     Sakshi Ayala  4/5/2018  7:32 AM
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
## 2018-04-05 NOTE — PLAN OF CARE
Face to face end of shift report received from WERNER Brumfield. Rounding completed. Patient observed. Sitting in the lounge area with peer - visiting and did have a snack of douglas crackers and saltines - and grape juice.    Cristina Irby  4/5/2018  2:57 AM

## 2018-04-05 NOTE — PLAN OF CARE
Problem: Patient Care Overview  Goal: Team Discussion  Team Plan:   BEHAVIORAL TEAM DISCUSSION    Participants: Elena Fajardo NP,Kiel Blair NP, Sanjana Rubio MaineGeneral Medical CenterSW,Florinda Julien LICSW, Chikis DCW, Sana Baird RN, Jacque Rosado RN,  India Arthur OT, Jackelin Wynn OT  Progress: Attending some programming  Continued Stay Criteria/Rationale: suicidal ideation, delusional. Started on wellbutrin. Will start PTA meds at lower doses- Seroquel, Wellbutrin XL  Monitor for ETOH withdrawal, will start MSSA protocol if needed  Medical/Physical: none known.   Precautions:   Behavioral Orders   Procedures     Assault precautions     Code 1 - Restrict to Unit     Routine Programming     As clinically indicated     Status 15     Every 15 minutes.     Plan: Writer faxed referral to [facility] for the pt and left a voicemail for the treatment director.    Rationale for change in precautions or plan: none

## 2018-04-05 NOTE — PLAN OF CARE
Problem: Patient Care Overview  Goal: Individualization & Mutuality  Patient will report managed anxiety and decreased depression prior to discharge.   Patient will report at least 6-8 hours of sleep each night.   Patient will be compliant with treatment team recommendations.    Pt woke up from a nap and states he's still so tired. Affect flat and blunted. Wearing sunglasses . To dayroom for dinner. Interacting with others. Denies any discomforts. Cooperative with nurse, no requests

## 2018-04-06 ENCOUNTER — APPOINTMENT (OUTPATIENT)
Dept: CT IMAGING | Facility: HOSPITAL | Age: 38
End: 2018-04-06
Attending: NURSE PRACTITIONER
Payer: MEDICAID

## 2018-04-06 LAB
ALBUMIN SERPL-MCNC: 3.7 G/DL (ref 3.4–5)
ALP SERPL-CCNC: 89 U/L (ref 40–150)
ALT SERPL W P-5'-P-CCNC: 96 U/L (ref 0–70)
AMMONIA PLAS-SCNC: 41 UMOL/L (ref 10–50)
AST SERPL W P-5'-P-CCNC: 92 U/L (ref 0–45)
BILIRUB DIRECT SERPL-MCNC: <0.1 MG/DL (ref 0–0.2)
BILIRUB SERPL-MCNC: 0.2 MG/DL (ref 0.2–1.3)
PROT SERPL-MCNC: 7.5 G/DL (ref 6.8–8.8)

## 2018-04-06 PROCEDURE — 12400000 ZZH R&B MH

## 2018-04-06 PROCEDURE — 80076 HEPATIC FUNCTION PANEL: CPT | Performed by: NURSE PRACTITIONER

## 2018-04-06 PROCEDURE — 70450 CT HEAD/BRAIN W/O DYE: CPT | Mod: TC

## 2018-04-06 PROCEDURE — 36415 COLL VENOUS BLD VENIPUNCTURE: CPT | Performed by: NURSE PRACTITIONER

## 2018-04-06 PROCEDURE — 25000132 ZZH RX MED GY IP 250 OP 250 PS 637: Performed by: NURSE PRACTITIONER

## 2018-04-06 PROCEDURE — 82140 ASSAY OF AMMONIA: CPT | Performed by: NURSE PRACTITIONER

## 2018-04-06 RX ADMIN — QUETIAPINE 300 MG: 200 TABLET, FILM COATED ORAL at 21:51

## 2018-04-06 RX ADMIN — ACETAMINOPHEN 650 MG: 325 TABLET, FILM COATED ORAL at 16:25

## 2018-04-06 RX ADMIN — HYDROXYZINE HYDROCHLORIDE 50 MG: 25 TABLET ORAL at 08:21

## 2018-04-06 RX ADMIN — ACETAMINOPHEN 650 MG: 325 TABLET, FILM COATED ORAL at 06:24

## 2018-04-06 RX ADMIN — TRAZODONE HYDROCHLORIDE 50 MG: 50 TABLET ORAL at 02:43

## 2018-04-06 RX ADMIN — BUPROPION HYDROCHLORIDE 150 MG: 150 TABLET, FILM COATED, EXTENDED RELEASE ORAL at 08:18

## 2018-04-06 RX ADMIN — PANTOPRAZOLE SODIUM 40 MG: 40 TABLET, DELAYED RELEASE ORAL at 06:24

## 2018-04-06 RX ADMIN — LORATADINE 10 MG: 10 TABLET ORAL at 08:18

## 2018-04-06 ASSESSMENT — ACTIVITIES OF DAILY LIVING (ADL)
DRESS: SCRUBS (BEHAVIORAL HEALTH)
GROOMING: INDEPENDENT
ORAL_HYGIENE: INDEPENDENT
LAUNDRY: UNABLE TO COMPLETE

## 2018-04-06 NOTE — PLAN OF CARE
Face to face end of shift report received from Naty GUTIERREZ RN. Rounding completed. Patient observed sitting in day room with peers. Requests PRN for headache at this time.     Ramya Robb  4/6/2018  5:56 PM

## 2018-04-06 NOTE — PLAN OF CARE
Problem: Patient Care Overview  Goal: Team Discussion  Team Plan:   BEHAVIORAL TEAM DISCUSSION    Participants: Jackelin Hollins NP,  Elena Fajardo NP,Kiel Blair NP, Sanjana Rubio [facility] HospitalSW,Florinda Julien [facility] HospitalSW, Chikis Myers LSW, India Arthur OT, Jackelin Wynn OT  Progress: Fair: participating in some groups and taking medications  Continued Stay Criteria/Rationale: Adjusting seroquel  Medical/Physical: TBI  Precautions:   Behavioral Orders   Procedures     Assault precautions     Code 1 - Restrict to Unit     Routine Programming     As clinically indicated     Status 15     Every 15 minutes.     Plan: Referring to IRTS and [facility]  Rationale for change in precautions or plan: N/a

## 2018-04-06 NOTE — PLAN OF CARE
Problem: Patient Care Overview  Goal: Individualization & Mutuality  Patient will report managed anxiety and decreased depression prior to discharge.   Patient will report at least 6-8 hours of sleep each night.   Patient will be compliant with treatment team recommendations.      Pt denies HI SI pain hallucinations. States anxiety is at 5/10; gave Atatrax 50mg at 0830.  Compliant with treatment team recommendations.  Less than 6 hours of sleep last night.    Pt spent time in lounge in sitting area watching tv, ate breakfast, returned to room at 0930 to sleep.  Pt speaks softly, and mumbles when he speaks; must repeat self several times to be understood.
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
## 2018-04-06 NOTE — DISCHARGE INSTRUCTIONS
Behavioral Discharge Planning and Instructions    Summary: Ar was admitted to the [address] [place] [address] for suicidal ideation    Main Diagnosis: Schizoaffective disorder- bipolar type    Major Treatments, Procedures and Findings: Stabilize with medications, connect with community programs.    Symptoms to Report: feeling more aggressive, increased confusion, losing more sleep, mood getting worse or thoughts of suicide    Lifestyle Adjustment: Take all medications as prescribed, meet with doctor/ medication provider, out patient therapist, , and [facility] worker as scheduled. Abstain from alcohol or any unprescribed drugs.      Psychiatry Follow-up:     [facility]: Medication Management: Melchor Sandoval NP Wednesday, April 18th @ 3:45pm  PCP: Jessica Dye April 17th @ 10:00am  [address] [place] [address]  Phone: [phone]  Fax: [phone]    [facility]: Referral 4/5/18  [address] [place] [address]  Phone: [phone]  Fax: [phone]    [facility]: Referral sent 4/5/18  [address] [place] [address]  Phone: [phone]  Fax: [phone]    [facility]: referral for case management sent 4/5/18  [address] [facility] [address], [place] [address]  Phone:  [phone]   Fax - [phone]    [facility]: Call or go to if you are in crisis  [address] [place]  [address]  Phone - [phone]  Fax - [phone]      Resources:   Crisis Intervention: 214.973.7779 or 743-554-0930 (TTY: 348.607.6769).  Call anytime for help.  National [place] on Mental Illness (www.[place].nagi.org): 693.876.7311 or 421-211-3538.  Alcoholics Anonymous (www.alcoholics-anonymous.org): Check your phone book for your local chapter.  Suicide Awareness Voices of Education (SAVE) (www.save.org): 915-111-VVWO (1097)  National Suicide Prevention Line (www.mentalhealthmn.org): 339-283-SYAW (9739)  Mental Health Consumer/Survivor Network of [place]  (www.mhcsn.net): 681-099-6939 or 106-147-2010  Mental Health Association of [place] (www.mentalhealth.org): 358.380.4075 or 475-479-8206    General Medication Instructions:   See your medication sheet(s) for instructions.   Take all medicines as directed.  Make no changes unless your doctor suggests them.   Go to all your doctor visits.  Be sure to have all your required lab tests. This way, your medicines can be refilled on time.  Do not use any drugs not prescribed by your doctor.  Avoid alcohol.    [place] Area:  [facility] [place] Crisis stabilization [facility]- [phone]  [facility] Crisis Line: 4-858-714-1658  Advocates For Family Peace: 810-9100  Sexual Assault Program [facility]: [phone] or 1-681.985.4314  [facility]'s Program: 3-050-768-3383 Ext: 279       Calls answered Mon-Fri-8:00 am--4:30 pm    [place]:  [facility] for Family Peace: 7-092-457-1028  [facility] first call for help: 5-803-857-1923  [facility]:  ([phone]) [phone]      [place] Area:  Warm Line: 1-912.601.4096       Calls answered Tuesday--Saturday 4:00 pm--10:00 pm  [facility] Crisis Line - [phone]  [facility] [place] [facility] [phone]    [place] Statewide:  [place] Crisis and Referral Services: 7-320-614-8773  National Suicide Prevention Lifeline: 4-944-515-TALK (8439)   - azn6csdn- Text  Life  to 11563  First Call for Help: 2-1-1  [facility] Helpline- 9-222-HEDS-HELP

## 2018-04-06 NOTE — PLAN OF CARE
Face to face end of shift report received from Rachel GILBERT RN. Rounding completed. Patient observed in [facility].     Jolynn Cruz  4/5/2018  11:25 PM

## 2018-04-06 NOTE — PROGRESS NOTES
Per hospitalist direction, CT of head ordered. Discussed with Dr. Howard who recommended without contrast.    Kiel Blair, APRN, CNP

## 2018-04-06 NOTE — PLAN OF CARE
Face to face end of shift report received from RED Tse RN. Rounding completed. Patient observed snoring in bed.     Naty Motta  4/6/2018  7:31 AM

## 2018-04-06 NOTE — PLAN OF CARE
Problem: Patient Care Overview  Goal: Discharge Needs Assessment  Writer spoke with pt financial who stated that pt does not have any active Medicare- that he must have let it lapse but they would do a change of address for his Medicaid and get it switched to [facility].  Writer spoke with Nataliya at [facility] who stated they did have openings but was not sure if his insurance would cover it because he just got out another IRTS program.  Writer had pt fill out the pt questionnaire for [facility] and faxed it back with the referral packet.      Received a call from [facility] that pt was accepted and he can go there on Monday. They spoke with him on the phone and explained that he needed to give them his social security money and would only get $99 left for personal needs.  Pt called his bank to see how much money he had and informed writer so writer called them back to inform them. Pt is calling his father to see if his father can bring his duffle bag of belongings to take with him on Monday.
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
## 2018-04-07 PROCEDURE — 25000132 ZZH RX MED GY IP 250 OP 250 PS 637: Performed by: NURSE PRACTITIONER

## 2018-04-07 PROCEDURE — 12400000 ZZH R&B MH

## 2018-04-07 RX ORDER — ZOLMITRIPTAN 5 MG/1
5 TABLET, FILM COATED ORAL
Status: DISCONTINUED | OUTPATIENT
Start: 2018-04-07 | End: 2018-04-09 | Stop reason: HOSPADM

## 2018-04-07 RX ORDER — ZOLMITRIPTAN 5 MG/1
5 TABLET, ORALLY DISINTEGRATING ORAL
Status: DISCONTINUED | OUTPATIENT
Start: 2018-04-07 | End: 2018-04-07

## 2018-04-07 RX ORDER — QUETIAPINE FUMARATE 200 MG/1
800 TABLET, FILM COATED ORAL AT BEDTIME
Status: DISCONTINUED | OUTPATIENT
Start: 2018-04-07 | End: 2018-04-09 | Stop reason: HOSPADM

## 2018-04-07 RX ADMIN — QUETIAPINE FUMARATE 800 MG: 200 TABLET, FILM COATED ORAL at 20:35

## 2018-04-07 RX ADMIN — OLANZAPINE 10 MG: 10 TABLET, FILM COATED ORAL at 15:20

## 2018-04-07 RX ADMIN — BUPROPION HYDROCHLORIDE 150 MG: 150 TABLET, FILM COATED, EXTENDED RELEASE ORAL at 09:13

## 2018-04-07 RX ADMIN — LORATADINE 10 MG: 10 TABLET ORAL at 09:14

## 2018-04-07 RX ADMIN — PANTOPRAZOLE SODIUM 40 MG: 40 TABLET, DELAYED RELEASE ORAL at 06:52

## 2018-04-07 RX ADMIN — ZOLMITRIPTAN 5 MG: 5 TABLET, FILM COATED ORAL at 13:56

## 2018-04-07 NOTE — PLAN OF CARE
Problem: Patient Care Overview  Goal: Individualization & Mutuality  Patient will report managed anxiety and decreased depression prior to discharge.   Patient will report at least 6-8 hours of sleep each night.   Patient will be compliant with treatment team recommendations.    Outcome: No Change  Pt. Denies HI, SI, depression, anxiety, and pain. Pt. Withdrawn and isolative. Pt. Cooperative with medications and nursing assessment. Pt.

## 2018-04-07 NOTE — PROGRESS NOTES
"[facility]  Psychiatric Progress Note    Subjective   This is a 37 year old male with schizoaffective disorder bipolar type and TBI per pt report. Ar asked that his seroquel be changed back to his original dose of 800 mg at hs. He also c/o \"a migraine\" which he reports he has been dx with but not by neurology. He reports nausea, hypersensitivity to light, and just wants the pounding to stop. Tylenol has not been effective. No cognitive changes observed.Reports that he is feeling better than when he first came in.   10 point ROS negative other than headache       DIagnoses:   Schizoaffective disorder, bipolar type  TBI-per patient report  Attestation:  Patient has been seen and evaluated by me, Jocelyne Barton, APRN CNP, in the presence of the house staff team          Interim History:   The patient's care was discussed with the treatment team and chart notes were reviewed.          Medications:       fluticasone  1 spray Both Nostrils Daily     loratadine  10 mg Oral Daily     nicotine  1 patch Transdermal Q24H     pantoprazole (PROTONIX) EC tablet 40 mg  40 mg Oral QAM AC     QUEtiapine (SEROquel) tablet 300 mg  300 mg Oral At Bedtime     buPROPion  150 mg Oral Daily     nicotine   Transdermal Daily     nicotine   Transdermal Q8H     ZOLMitriptan, albuterol, promethazine, melatonin, hydrOXYzine, acetaminophen, alum & mag hydroxide-simethicone, magnesium hydroxide, traZODone, OLANZapine **OR** OLANZapine, nicotine polacrilex          Allergies:     Allergies   Allergen Reactions     Penicillin G Hives     Tuna Flavor Anaphylaxis     Fish-Derived Products      Fish allergy, especially Perch.     No Clinical Screening - See Comments      Tuna and shell fish     Shellfish-Derived Products Itching     Penicillins Rash            Psychiatric Examination:   /80  Pulse 96  Temp 98.6  F (37  C) (Tympanic)  Resp 20  Ht 5' 10\" (1.778 m)  Wt 233 lb 9.6 oz (106 kg)  SpO2 95%  BMI 33.52 " kg/m2  Weight is 233 lbs 9.6 oz  Body mass index is 33.52 kg/(m^2).    Appearance:  awake, alert with sunglasses on  Attitude:  cooperative  Eye Contact:  fair  Mood:  better  Affect:  mood congruent and intensity is blunted  Speech:  speaks softly  Psychomotor Behavior:  no evidence of tardive dyskinesia, dystonia, or tics and intact station, gait and muscle tone  Thought Process:  logical  Associations:  no loose associations  Thought Content:  no evidence of suicidal ideation or homicidal ideation and no evidence of psychotic thought  Insight:  fair  Judgment:  fair  Oriented to:  time, person, and place  Attention Span and Concentration:  fair  Recent and Remote Memory:  fair  Fund of Knowledge: appropriate  Muscle Strength and Tone: normal  Gait and Station: Normal  Perception_ does appear preoccupied at times         Labs:   No results found for this or any previous visit (from the past 24 hour(s)).          Assessment/ Plan:    Will increase seroquel from 300 mg to 400mg (2) tabs at hs.

## 2018-04-07 NOTE — PROGRESS NOTES
04/07/18 1456   Patient Belongings   Did you bring any home meds/supplements to the hospital?  Yes   Disposition of meds  Sent to security/pharmacy per site process   Patient Belongings cell phone/electronics;clothing;jewelry;tote  (all belongings sent to patients cubby.)   Disposition of Belongings Locker  (belongings sent to cubby in patient belongings room.)   Belongings Search Yes   Clothing Search Yes   Second Staff sho SPENCER   General Info Comment grey and black hoodie, 1 pair black jeans, grey polo shirt, red and black basketball shorts, navy blue t shirt northstar, 1 pair grey socks, 2 white phone chargers, black headphones, black dry erase marker, 5 tubes of colgate toothpaste,  2 tubes of crest toothpate, virtual reality eye glasses, 2  boxes q tips, 2 ronald gel  deodorant, 6 bottles suave men 2-1 shampoo and conditoner,  2 toothbrushes, 1 ronald razor with extra razor blade, 2 packs [employer] cigs, water proof in ear ear phones, old spice swagger body wash, noxema shaving cream, barbasol shaving cream, navy blue hooded fleece, back tshirt with dope in red, blue checkered polo, 2 bath towels, 2 hand towels, 2 pairs blue jeans, 1 wash cloth, irsh spring spped stick, gum toothpicks, gold watch, 3 travel size body lotion, 2 black phone chargers, 1 ivory bar soap, 2 hair brushes, 3 rollstick dedorant, blue hard drive, 2 estella mouse child pj sets, pink levon mouse baby blanket, red child t shirt, red and white phone , grey hair brush, dental floss, 14 pairs of white socks, 2 pair black socks, black comb,     List items sent to safe: black cell phone    All other belongings put in assigned cubby in belongings room.     I have reviewed my belongings list on admission and verify that it is correct.     Patient signature_______________________________    Second staff witness (if patient unable to sign) ______________________________       I have received all my belongings at discharge.    Patient  signature________________________________    Annetta  4/7/2018  3:09 PM
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
## 2018-04-07 NOTE — PLAN OF CARE
Problem: Patient Care Overview  Goal: Individualization & Mutuality  Patient will report managed anxiety and decreased depression prior to discharge.   Patient will report at least 6-8 hours of sleep each night.   Patient will be compliant with treatment team recommendations.    Pt appears to be sleeping in bed with eyes closed, having regular respirations and position changes. 15 minutes and PRN safety checks completed with no noted complains with exception of requesting snack and returning to bed. Will continue to monitor.         Problem: Depression (Adult,Obstetrics,Pediatric)  Goal: Establish/Maintain Self-Care Routine  Patient will perform all ADL's independently while on unit.   Patient will attend at least 50% of groups while on unit.    Pt appears to be sleeping in bed with eyes closed, having regular respirations and position changes. 15 minutes and PRN safety checks completed with no noted complains with exception of requesting snack and returning to bed. Will continue to monitor.

## 2018-04-07 NOTE — PLAN OF CARE
Face to face end of shift report received from WERNER Bui. Rounding completed. Patient observed resting in bed.     CUCO ORTIZ  4/7/2018  1:09 AM

## 2018-04-07 NOTE — PLAN OF CARE
Face to face end of shift report received from Opal WILCOX RN. Rounding completed. Patient observed.     Porsha Moya  4/7/2018  7:47 AM

## 2018-04-07 NOTE — PLAN OF CARE
Face to face end of shift report received from Porsha DEJESUS RN. Rounding completed. Patient observed watching television in day room. No requests at this time.     Ramya Robb  4/7/2018  3:56 PM

## 2018-04-08 LAB
ALBUMIN SERPL-MCNC: 3.6 G/DL (ref 3.4–5)
ALP SERPL-CCNC: 81 U/L (ref 40–150)
ALT SERPL W P-5'-P-CCNC: 86 U/L (ref 0–70)
AST SERPL W P-5'-P-CCNC: 54 U/L (ref 0–45)
BILIRUB DIRECT SERPL-MCNC: <0.1 MG/DL (ref 0–0.2)
BILIRUB SERPL-MCNC: 0.2 MG/DL (ref 0.2–1.3)
PROT SERPL-MCNC: 7.4 G/DL (ref 6.8–8.8)

## 2018-04-08 PROCEDURE — 25000132 ZZH RX MED GY IP 250 OP 250 PS 637: Performed by: NURSE PRACTITIONER

## 2018-04-08 PROCEDURE — 80076 HEPATIC FUNCTION PANEL: CPT | Performed by: NURSE PRACTITIONER

## 2018-04-08 PROCEDURE — 25000125 ZZHC RX 250: Performed by: NURSE PRACTITIONER

## 2018-04-08 PROCEDURE — 12400000 ZZH R&B MH

## 2018-04-08 PROCEDURE — 36415 COLL VENOUS BLD VENIPUNCTURE: CPT | Performed by: NURSE PRACTITIONER

## 2018-04-08 RX ADMIN — BUPROPION HYDROCHLORIDE 150 MG: 150 TABLET, FILM COATED, EXTENDED RELEASE ORAL at 08:57

## 2018-04-08 RX ADMIN — OLANZAPINE 10 MG: 10 TABLET, FILM COATED ORAL at 07:43

## 2018-04-08 RX ADMIN — OLANZAPINE 10 MG: 10 TABLET, FILM COATED ORAL at 16:17

## 2018-04-08 RX ADMIN — OLANZAPINE 10 MG: 10 INJECTION, POWDER, LYOPHILIZED, FOR SOLUTION INTRAMUSCULAR at 01:04

## 2018-04-08 RX ADMIN — LORATADINE 10 MG: 10 TABLET ORAL at 08:57

## 2018-04-08 RX ADMIN — FLUTICASONE PROPIONATE 1 SPRAY: 50 SPRAY, METERED NASAL at 09:06

## 2018-04-08 RX ADMIN — ZOLMITRIPTAN 5 MG: 5 TABLET, FILM COATED ORAL at 14:55

## 2018-04-08 RX ADMIN — QUETIAPINE FUMARATE 800 MG: 200 TABLET, FILM COATED ORAL at 20:56

## 2018-04-08 ASSESSMENT — ACTIVITIES OF DAILY LIVING (ADL)
ORAL_HYGIENE: INDEPENDENT
LAUNDRY: UNABLE TO COMPLETE
ORAL_HYGIENE: INDEPENDENT
GROOMING: INDEPENDENT
GROOMING: INDEPENDENT
DRESS: SCRUBS (BEHAVIORAL HEALTH);INDEPENDENT
DRESS: SCRUBS (BEHAVIORAL HEALTH);INDEPENDENT
LAUNDRY: UNABLE TO COMPLETE

## 2018-04-08 NOTE — PLAN OF CARE
"1455: Pt requested and received PRN Zomig for migraine/headache. Pt rated pain a \"9\" on a 0-10 numeric scale.   "

## 2018-04-08 NOTE — PLAN OF CARE
Face to face end of shift report received from Ramya CALDERA. Rounding completed. Patient observed.     Sakshi Ayala  4/7/2018  11:44 PM
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
## 2018-04-08 NOTE — PLAN OF CARE
Face to face end of shift report received from Ari MARQUES RN. Rounding completed. Patient observed in -ICU, in his room pacing, asking to talk with a provider and requesting a PRN for anxiety/aggitation 9/10.     Tiana Robledo  4/8/2018  7:53 AM
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
## 2018-04-08 NOTE — PLAN OF CARE
Continued Care. Rounding completed. Patient observed in -ICU laying in bed.     Tiana Robledo  4/8/2018  4:03 PM

## 2018-04-09 VITALS
HEART RATE: 105 BPM | TEMPERATURE: 96.9 F | OXYGEN SATURATION: 95 % | WEIGHT: 233.6 LBS | BODY MASS INDEX: 33.44 KG/M2 | SYSTOLIC BLOOD PRESSURE: 125 MMHG | RESPIRATION RATE: 20 BRPM | HEIGHT: 70 IN | DIASTOLIC BLOOD PRESSURE: 84 MMHG

## 2018-04-09 LAB — DEPRECATED CALCIDIOL+CALCIFEROL SERPL-MC: 10 UG/L (ref 20–75)

## 2018-04-09 PROCEDURE — 25000132 ZZH RX MED GY IP 250 OP 250 PS 637: Performed by: NURSE PRACTITIONER

## 2018-04-09 RX ORDER — BUPROPION HYDROCHLORIDE 150 MG/1
150 TABLET ORAL DAILY
Qty: 30 TABLET | Refills: 0 | Status: ON HOLD | OUTPATIENT
Start: 2018-04-10 | End: 2018-04-29

## 2018-04-09 RX ORDER — LANOLIN ALCOHOL/MO/W.PET/CERES
9 CREAM (GRAM) TOPICAL
Qty: 90 TABLET | Refills: 0 | Status: SHIPPED | OUTPATIENT
Start: 2018-04-09 | End: 2018-04-17

## 2018-04-09 RX ORDER — NICOTINE 21 MG/24HR
1 PATCH, TRANSDERMAL 24 HOURS TRANSDERMAL EVERY 24 HOURS
Qty: 28 PATCH | Refills: 0 | Status: ON HOLD | OUTPATIENT
Start: 2018-04-10 | End: 2018-04-29

## 2018-04-09 RX ORDER — HYDROXYZINE HYDROCHLORIDE 25 MG/1
25-50 TABLET, FILM COATED ORAL EVERY 4 HOURS PRN
Qty: 120 TABLET | Refills: 0 | Status: ON HOLD | OUTPATIENT
Start: 2018-04-09 | End: 2018-04-29

## 2018-04-09 RX ORDER — QUETIAPINE FUMARATE 400 MG/1
800 TABLET, FILM COATED ORAL AT BEDTIME
Qty: 60 TABLET | Refills: 0 | Status: ON HOLD | OUTPATIENT
Start: 2018-04-09 | End: 2018-04-29

## 2018-04-09 RX ADMIN — HYDROXYZINE HYDROCHLORIDE 50 MG: 25 TABLET ORAL at 11:07

## 2018-04-09 RX ADMIN — OLANZAPINE 10 MG: 10 TABLET, FILM COATED ORAL at 14:05

## 2018-04-09 RX ADMIN — BUPROPION HYDROCHLORIDE 150 MG: 150 TABLET, FILM COATED, EXTENDED RELEASE ORAL at 08:19

## 2018-04-09 RX ADMIN — PANTOPRAZOLE SODIUM 40 MG: 40 TABLET, DELAYED RELEASE ORAL at 06:37

## 2018-04-09 RX ADMIN — LORATADINE 10 MG: 10 TABLET ORAL at 08:19

## 2018-04-09 RX ADMIN — ACETAMINOPHEN 650 MG: 325 TABLET, FILM COATED ORAL at 14:20

## 2018-04-09 RX ADMIN — OLANZAPINE 10 MG: 10 TABLET, FILM COATED ORAL at 08:19

## 2018-04-09 ASSESSMENT — ACTIVITIES OF DAILY LIVING (ADL)
GROOMING: INDEPENDENT
ORAL_HYGIENE: INDEPENDENT
DRESS: INDEPENDENT
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
## 2018-04-09 NOTE — PLAN OF CARE
Problem: Patient Care Overview  Goal: Discharge Needs Assessment  Pt is discharging at the recommendation of the treatment team. Pt is discharging to [facility] transported by [facility]. Pt denies having any thoughts of hurting themself or anyone else. Pt denies anxiety or depression. Pt has follow up with Raquel Ruiz. Discharge instructions, including; demographic sheet, psychiatric evaluation, discharge summary, and AVS were faxed to these next level of care providers.

## 2018-04-09 NOTE — PLAN OF CARE
Face to face end of shift report received from Ramya MARTE RN. Rounding completed. Patient observed resting in bed.     Eh Mcdonald  4/9/2018  7:58 AM
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
## 2018-04-09 NOTE — PLAN OF CARE
Problem: Patient Care Overview  Goal: Individualization & Mutuality  Patient will report managed anxiety and decreased depression prior to discharge.   Patient will report at least 6-8 hours of sleep each night.   Patient will be compliant with treatment team recommendations.    Outcome: Adequate for Discharge Date Met: 04/09/18  Discharge Note    Patient Discharged to home on 4/9/2018 2:52 PM via bus accompanied by transportation staff.     Patient informed of discharge instructions in AVS. patient verbalizes understanding and denies having any questions pertaining to AVS. Patient stable at time of discharge. Patient denies SI, HI, and thoughts of self harm at time of discharge. All personal belongings returned to patient. Discharge prescriptions sent to [place], [place] pharmacy  via electronic communication. Psych evaluation, history and physical, AVS, and discharge summary faxed to next level of care- per . Pt left with home medications in the home medications was pt's bottle of ativan which was discontinued while being here. Contacted Kiel JOSEPH on weather to destroy medication or send home with pt and writer was instructed that pt was able to take this home medication home with him.     Eh Mcdonald  4/9/2018  2:52 PM

## 2018-04-09 NOTE — PLAN OF CARE
Face to face end of shift report received from Tiana MOON RN. Rounding completed. Patient observed laying in bed with eyes closed, right sided, moderate snoring noted.      Ramya Robb  4/9/2018  12:21 AM

## 2018-04-09 NOTE — PLAN OF CARE
Problem: Patient Care Overview  Goal: Individualization & Mutuality  Patient will report managed anxiety and decreased depression prior to discharge.   Patient will report at least 6-8 hours of sleep each night.   Patient will be compliant with treatment team recommendations.    Patient awake off/on throughout shift. Requesting snacks and restroom use. When laying in bed, patient changing positions from laying to sitting up with severe snoring noted. [facility] through the shift and again falling asleep with heavy snoring. Difficult to redirect with the few attempts from this writer so patient stayed sleeping in chair until later in the shift. Continue to monitor at this time.

## 2018-04-17 ENCOUNTER — OFFICE VISIT (OUTPATIENT)
Dept: FAMILY MEDICINE | Facility: OTHER | Age: 38
End: 2018-04-17
Attending: FAMILY MEDICINE
Payer: MEDICAID

## 2018-04-17 VITALS
DIASTOLIC BLOOD PRESSURE: 86 MMHG | TEMPERATURE: 97.9 F | HEART RATE: 108 BPM | WEIGHT: 235 LBS | BODY MASS INDEX: 33.72 KG/M2 | SYSTOLIC BLOOD PRESSURE: 126 MMHG | OXYGEN SATURATION: 95 %

## 2018-04-17 DIAGNOSIS — R07.0 THROAT PAIN: ICD-10-CM

## 2018-04-17 DIAGNOSIS — R11.0 NAUSEA: ICD-10-CM

## 2018-04-17 DIAGNOSIS — R10.2 SUPRAPUBIC PAIN: ICD-10-CM

## 2018-04-17 DIAGNOSIS — G47.33 OSA (OBSTRUCTIVE SLEEP APNEA): ICD-10-CM

## 2018-04-17 DIAGNOSIS — R13.10 DYSPHAGIA, UNSPECIFIED TYPE: ICD-10-CM

## 2018-04-17 DIAGNOSIS — R19.7 DIARRHEA OF PRESUMED INFECTIOUS ORIGIN: ICD-10-CM

## 2018-04-17 DIAGNOSIS — R40.0 SOMNOLENCE: ICD-10-CM

## 2018-04-17 DIAGNOSIS — M79.10 MYALGIA: ICD-10-CM

## 2018-04-17 DIAGNOSIS — F25.0 SCHIZOAFFECTIVE DISORDER, BIPOLAR TYPE (H): Primary | ICD-10-CM

## 2018-04-17 DIAGNOSIS — H66.004 RECURRENT ACUTE SUPPURATIVE OTITIS MEDIA OF RIGHT EAR WITHOUT SPONTANEOUS RUPTURE OF TYMPANIC MEMBRANE: ICD-10-CM

## 2018-04-17 DIAGNOSIS — R81 GLUCOSURIA: ICD-10-CM

## 2018-04-17 DIAGNOSIS — E11.9 TYPE 2 DIABETES MELLITUS WITHOUT COMPLICATION, WITHOUT LONG-TERM CURRENT USE OF INSULIN (H): ICD-10-CM

## 2018-04-17 PROBLEM — F79 INTELLECTUAL DISABILITY: Status: ACTIVE | Noted: 2018-02-07

## 2018-04-17 PROBLEM — F25.9 SCHIZOAFFECTIVE DISORDER (H): Status: ACTIVE | Noted: 2018-02-07

## 2018-04-17 PROBLEM — K21.9 GASTROESOPHAGEAL REFLUX: Status: ACTIVE | Noted: 2018-02-07

## 2018-04-17 LAB
ALBUMIN SERPL-MCNC: 3.8 G/DL (ref 3.4–5)
ALBUMIN UR-MCNC: NEGATIVE MG/DL
ALP SERPL-CCNC: 105 U/L (ref 40–150)
ALT SERPL W P-5'-P-CCNC: 59 U/L (ref 0–70)
ANION GAP SERPL CALCULATED.3IONS-SCNC: 9 MMOL/L (ref 3–14)
APPEARANCE UR: CLEAR
AST SERPL W P-5'-P-CCNC: 53 U/L (ref 0–45)
BASOPHILS # BLD AUTO: 0.1 10E9/L (ref 0–0.2)
BASOPHILS NFR BLD AUTO: 0.9 %
BILIRUB SERPL-MCNC: 0.3 MG/DL (ref 0.2–1.3)
BILIRUB UR QL STRIP: NEGATIVE
BUN SERPL-MCNC: 7 MG/DL (ref 7–30)
CALCIUM SERPL-MCNC: 9.1 MG/DL (ref 8.5–10.1)
CHLORIDE SERPL-SCNC: 100 MMOL/L (ref 94–109)
CO2 SERPL-SCNC: 25 MMOL/L (ref 20–32)
COLOR UR AUTO: ABNORMAL
CREAT SERPL-MCNC: 0.62 MG/DL (ref 0.66–1.25)
CRP SERPL-MCNC: 8.6 MG/L (ref 0–8)
DIFFERENTIAL METHOD BLD: ABNORMAL
EOSINOPHIL # BLD AUTO: 0.3 10E9/L (ref 0–0.7)
EOSINOPHIL NFR BLD AUTO: 2.3 %
ERYTHROCYTE [DISTWIDTH] IN BLOOD BY AUTOMATED COUNT: 13.3 % (ref 10–15)
EST. AVERAGE GLUCOSE BLD GHB EST-MCNC: 192 MG/DL
GFR SERPL CREATININE-BSD FRML MDRD: >90 ML/MIN/1.7M2
GLUCOSE SERPL-MCNC: 400 MG/DL (ref 70–99)
GLUCOSE UR STRIP-MCNC: >1000 MG/DL
HBA1C MFR BLD: 8.3 % (ref 0–6.4)
HCT VFR BLD AUTO: 45.4 % (ref 40–53)
HGB BLD-MCNC: 16.3 G/DL (ref 13.3–17.7)
HGB UR QL STRIP: NEGATIVE
IMM GRANULOCYTES # BLD: 0.2 10E9/L (ref 0–0.4)
IMM GRANULOCYTES NFR BLD: 2 %
KETONES UR STRIP-MCNC: 10 MG/DL
LEUKOCYTE ESTERASE UR QL STRIP: NEGATIVE
LIPASE SERPL-CCNC: 183 U/L (ref 73–393)
LITHIUM SERPL-SCNC: <0.2 MMOL/L (ref 0.6–1.2)
LYMPHOCYTES # BLD AUTO: 3.1 10E9/L (ref 0.8–5.3)
LYMPHOCYTES NFR BLD AUTO: 27.1 %
MCH RBC QN AUTO: 29.4 PG (ref 26.5–33)
MCHC RBC AUTO-ENTMCNC: 35.9 G/DL (ref 31.5–36.5)
MCV RBC AUTO: 82 FL (ref 78–100)
MONOCYTES # BLD AUTO: 0.7 10E9/L (ref 0–1.3)
MONOCYTES NFR BLD AUTO: 6.1 %
NEUTROPHILS # BLD AUTO: 7 10E9/L (ref 1.6–8.3)
NEUTROPHILS NFR BLD AUTO: 61.6 %
NITRATE UR QL: NEGATIVE
NRBC # BLD AUTO: 0 10*3/UL
NRBC BLD AUTO-RTO: 0 /100
PH UR STRIP: 6 PH (ref 4.7–8)
PLATELET # BLD AUTO: 300 10E9/L (ref 150–450)
POTASSIUM SERPL-SCNC: 4.4 MMOL/L (ref 3.4–5.3)
PROT SERPL-MCNC: 8.3 G/DL (ref 6.8–8.8)
RBC # BLD AUTO: 5.55 10E12/L (ref 4.4–5.9)
SODIUM SERPL-SCNC: 134 MMOL/L (ref 133–144)
SOURCE: ABNORMAL
SP GR UR STRIP: 1.03 (ref 1–1.03)
UROBILINOGEN UR STRIP-MCNC: NORMAL MG/DL (ref 0–2)
VALPROATE SERPL-MCNC: 33 MG/L (ref 50–100)
WBC # BLD AUTO: 11.3 10E9/L (ref 4–11)

## 2018-04-17 PROCEDURE — 80178 ASSAY OF LITHIUM: CPT | Mod: ZL | Performed by: FAMILY MEDICINE

## 2018-04-17 PROCEDURE — 80053 COMPREHEN METABOLIC PANEL: CPT | Mod: ZL | Performed by: FAMILY MEDICINE

## 2018-04-17 PROCEDURE — 81003 URINALYSIS AUTO W/O SCOPE: CPT | Mod: ZL | Performed by: FAMILY MEDICINE

## 2018-04-17 PROCEDURE — 36415 COLL VENOUS BLD VENIPUNCTURE: CPT | Mod: ZL | Performed by: FAMILY MEDICINE

## 2018-04-17 PROCEDURE — G0463 HOSPITAL OUTPT CLINIC VISIT: HCPCS

## 2018-04-17 PROCEDURE — 85025 COMPLETE CBC W/AUTO DIFF WBC: CPT | Mod: ZL | Performed by: FAMILY MEDICINE

## 2018-04-17 PROCEDURE — 83690 ASSAY OF LIPASE: CPT | Mod: ZL | Performed by: FAMILY MEDICINE

## 2018-04-17 PROCEDURE — 86140 C-REACTIVE PROTEIN: CPT | Mod: ZL | Performed by: FAMILY MEDICINE

## 2018-04-17 PROCEDURE — 80164 ASSAY DIPROPYLACETIC ACD TOT: CPT | Mod: ZL | Performed by: FAMILY MEDICINE

## 2018-04-17 PROCEDURE — 83036 HEMOGLOBIN GLYCOSYLATED A1C: CPT | Mod: ZL | Performed by: FAMILY MEDICINE

## 2018-04-17 PROCEDURE — 99203 OFFICE O/P NEW LOW 30 MIN: CPT | Performed by: FAMILY MEDICINE

## 2018-04-17 PROCEDURE — 40000788 ZZHCL STATISTIC ESTIMATED AVERAGE GLUCOSE: Mod: ZL | Performed by: FAMILY MEDICINE

## 2018-04-17 RX ORDER — DIVALPROEX SODIUM 500 MG/1
1000 TABLET, EXTENDED RELEASE ORAL AT BEDTIME
COMMUNITY
End: 2018-04-19

## 2018-04-17 RX ORDER — NYSTATIN 100000/ML
500000 SUSPENSION, ORAL (FINAL DOSE FORM) ORAL 4 TIMES DAILY
Qty: 60 ML | Refills: 0 | Status: ON HOLD | OUTPATIENT
Start: 2018-04-17 | End: 2018-04-29

## 2018-04-17 ASSESSMENT — PAIN SCALES - GENERAL: PAINLEVEL: MILD PAIN (3)

## 2018-04-17 NOTE — NURSING NOTE
"Chief Complaint   Patient presents with     Establish Care       Initial /86  Pulse 108  Temp 97.9  F (36.6  C) (Tympanic)  Wt 235 lb (106.6 kg)  SpO2 95%  BMI 33.72 kg/m2 Estimated body mass index is 33.72 kg/(m^2) as calculated from the following:    Height as of 4/3/18: 5' 10\" (1.778 m).    Weight as of this encounter: 235 lb (106.6 kg).  Medication Reconciliation: complete     Alicia Waite    "

## 2018-04-17 NOTE — MR AVS SNAPSHOT
After Visit Summary   4/17/2018    Ar Irby III    MRN: [id number]           Patient Information     Date Of Birth          1980        Visit Information        Provider Department      4/17/2018 11:00 AM Kathy Anderson MD [facility]        Today's Diagnoses     Schizoaffective disorder, bipolar type (H)    -  1    Somnolence        Diarrhea of presumed infectious origin        Nausea        Throat pain        Myalgia        Dysphagia, unspecified type        Suprapubic pain        JANIE (obstructive sleep apnea)        Recurrent acute suppurative otitis media of right ear without spontaneous rupture of tympanic membrane        Glucosuria        Type 2 diabetes mellitus without complication, without long-term current use of insulin (H)           Follow-ups after your visit        Additional Services     OTOLARYNGOLOGY REFERRAL       Your provider has referred you to: [facility]uer[facility] [phone]   [email or web address]    Please be aware that coverage of these services is subject to the terms and limitations of your health insurance plan.  Call member services at your health plan with any benefit or coverage questions.      Please bring the following with you to your appointment:    (1) Any X-Rays, CTs or MRIs which have been performed.  Contact the facility where they were done to arrange for  prior to your scheduled appointment.   (2) List of current medications  (3) This referral request   (4) Any documents/labs given to you for this referral            SLEEP EVALUATION & MANAGEMENT REFERRAL - PEDIATRIC (AGE 2-17) -       Please be aware that coverage of these services is subject to the terms and limitations of your health insurance plan.  Call member services at your health plan with any benefit or coverage questions.      Please bring the following to your appointment:    >>   List of current  "medications   >>   This referral request   >>   Any documents/labs given to you for this referral                        Your next 10 appointments already scheduled     May 02, 2018  1:00 PM CDT   (Arrive by 12:45 PM)   SHORT with Kathy Anderson MD   [facility] ([facility] [place] )    [address]  [place] [address]   [phone]            May 10, 2018 11:00 AM CDT   (Arrive by 10:45 AM)   New Visit with TOMEKA Ponce CNP   [facility] ([facility] [place] )    [address] [place] [address]  [place] [address] [phone]              Future tests that were ordered for you today     Open Future Orders        Priority Expected Expires Ordered    Clostridium difficile Toxin B PCR Routine  5/17/2018 4/17/2018    Stool culture SSCE Routine  4/17/2019 4/17/2018    SLEEP EVALUATION & MANAGEMENT REFERRAL - PEDIATRIC (AGE 2-17) - Routine  7/16/2018 4/17/2018            Who to contact     If you have questions or need follow up information about today's clinic visit or your schedule please contact [facility] directly at [phone].  Normal or non-critical lab and imaging results will be communicated to you by MyChart, letter or phone within 4 business days after the clinic has received the results. If you do not hear from us within 7 days, please contact the clinic through MyChart or phone. If you have a critical or abnormal lab result, we will notify you by phone as soon as possible.  Submit refill requests through rumr or call your pharmacy and they will forward the refill request to us. Please allow 3 business days for your refill to be completed.          Additional Information About Your Visit        BlueLithium[employer] Information     rumr lets you send messages to your doctor, view your test results, renew your prescriptions, schedule appointments and more. To sign up, go to [email or web address] . Click on \"Log in\" on the left side of " "the screen, which will take you to the Welcome page. Then click on \"Sign up Now\" on the right side of the page.     You will be asked to enter the access code listed below, as well as some personal information. Please follow the directions to create your username and password.     Your access code is: [email or web address]  Expires: 2018 12:02 PM     Your access code will  in 90 days. If you need help or a new code, please call your [place] clinic or [phone].        Care EveryWhere ID     This is your Care EveryWhere ID. This could be used by other organizations to access your [place] medical records  [id number]        Your Vitals Were     Pulse Temperature Pulse Oximetry BMI (Body Mass Index)          108 97.9  F (36.6  C) (Tympanic) 95% 33.72 kg/m2         Blood Pressure from Last 3 Encounters:   18 126/86    Weight from Last 3 Encounters:   18 235 lb (106.6 kg)              We Performed the Following     CBC with platelets and differential     Comprehensive metabolic panel (BMP + Alb, Alk Phos, ALT, AST, Total. Bili, TP)     CRP, inflammation     Hemoglobin A1c     Lipase     Lithium level     OTOLARYNGOLOGY REFERRAL     UA reflex to Microscopic and Culture - [place]     Valproic acid          Today's Medication Changes          These changes are accurate as of 18 12:37 PM.  If you have any questions, ask your nurse or doctor.               Start taking these medicines.        Dose/Directions    magic mouthwash suspension   Commonly known as:  ENTER INGREDIENTS IN COMMENTS   Used for:  Throat pain, Dysphagia, unspecified type   Started by:  Kathy Anderson MD        Dose:  5-10 mL   Swish and spit 5-10 mLs in mouth every 6 hours as needed   Quantity:  180 mL   Refills:  1       nystatin 086593 UNIT/ML suspension   Commonly known as:  MYCOSTATIN   Used for:  Throat pain, Dysphagia, unspecified type   Started by:  Kathy Anderson MD        Dose:  257910 Units   Take 5 mLs (500,000 " Units) by mouth 4 times daily   Quantity:  60 mL   Refills:  0            Where to get your medicines      These medications were sent to [facility] - [place], [place] - [address]  [address] [address]     Phone:  [phone]     nystatin 150656 UNIT/ML suspension         Some of these will need a paper prescription and others can be bought over the counter.  Ask your nurse if you have questions.     Bring a paper prescription for each of these medications     magic mouthwash suspension                Primary Care Provider Fax #    Physician No Ref-Primary [phone]       No address on file        Equal Access to Services     [facility] : Hadii farnaz talbot hadasho Soleonor, waaxda luqadaha, qaybta kaalmada coleen, rolando izquierdo . So [facility] [phone].    ATENCIÓN: Si habla español, tiene a glaser disposición servicios gratuitos de asistencia lingüística. [facility] [phone].    We comply with applicable federal civil rights laws and [place] laws. We do not discriminate on the basis of race, color, national origin, age, disability, sex, sexual orientation, or gender identity.            Thank you!     Thank you for choosing [facility]  for your care. Our goal is always to provide you with excellent care. Hearing back from our patients is one way we can continue to improve our services. Please take a few minutes to complete the written survey that you may receive in the mail after your visit with us. Thank you!             Your Updated Medication List - Protect others around you: Learn how to safely use, store and throw away your medicines at www.disposemymeds.org.          This list is accurate as of 4/17/18 12:37 PM.  Always use your most recent med list.                   Brand Name Dispense Instructions for use Diagnosis    buPROPion 150 MG 24 hr tablet    WELLBUTRIN XL    30 tablet    Take 1 tablet (150 mg) by mouth daily    Suicidal  ideation       divalproex sodium extended-release 500 MG 24 hr tablet    DEPAKOTE ER     Take 1,000 mg by mouth At Bedtime        fluticasone 50 MCG/ACT spray    FLONASE     1 spray        hydrOXYzine 25 MG tablet    ATARAX    120 tablet    Take 1-2 tablets (25-50 mg) by mouth every 4 hours as needed for anxiety    Anxiety       loratadine 10 MG tablet    CLARITIN     Take 10 mg by mouth daily        LORAZEPAM PO      Take 0.5 mg by mouth 2 times daily as needed for anxiety        magic mouthwash suspension    ENTER INGREDIENTS IN COMMENTS    180 mL    Swish and spit 5-10 mLs in mouth every 6 hours as needed    Throat pain, Dysphagia, unspecified type       nicotine 21 MG/24HR 24 hr patch    NICODERM CQ    28 patch    Place 1 patch onto the skin every 24 hours    Nicotine dependence, uncomplicated, unspecified nicotine product type       nystatin 018360 UNIT/ML suspension    MYCOSTATIN    60 mL    Take 5 mLs (500,000 Units) by mouth 4 times daily    Throat pain, Dysphagia, unspecified type       promethazine 25 MG tablet    PHENERGAN     Take 25 mg by mouth every 6 hours as needed for nausea        PROTONIX PO      Take 40 mg by mouth        QUEtiapine 400 MG tablet    SEROquel    60 tablet    Take 2 tablets (800 mg) by mouth At Bedtime    Suicidal ideation       SUMATRIPTAN SUCCINATE PO      Take 50 mg by mouth every 12 hours        VENTOLIN  (90 Base) MCG/ACT Inhaler   Generic drug:  albuterol      Inhale 2 puffs into the lungs Every 4-6 hours as needed

## 2018-04-17 NOTE — PROGRESS NOTES
SUBJECTIVE:                                                    Ar Irby III is a 37 year old male who presents to clinic today for the following health issues:    Establish care  Patient is a 38 yo M with history of Schizoaffective disorder, bipolar type, JANIE, asthma who presents to clinic today to [facility]. Previously seen through [facility] in [place]. He also has multiple somatic complaints/concerns which are noted below. He was recently hospitalized for SI at [facility] this month. He had apparently been off of his medications. These were restarted at lower doses. He was discharged home. Currently living with his dad. Of note, CT of the head was done due to somnolence during inpatient stay. He is requesting sleep med appt to get a new CPAP.    RESPIRATORY SYMPTOMS      Duration: couple months    Description  nasal congestion, sore throat, cough, fever, ear pain both, headache, fatigue/malaise, hoarse voice, stomach ache and diarrhea    Severity: none    Accompanying signs and symptoms: been sweating a lot. No appetite. Having trouble swallowing. Wants to see a throat specialist    History (predisposing factors):  none    Precipitating or alleviating factors: None    Therapies tried and outcome:  rest and fluids    Patient reports sinus drainage, congestion, cough, bilateral ear pain and pressure. States his voice is hoarse, throat is sore and he is having diarrhea. Reports losing weight. Was told he needed to see an ENT provider for a problem with his throat. He is a very difficult historian. He is falling asleep during much of the interview. When prompted with repeat questions, however, gets irritable. Of note, per facility record, he just completed course of Levaquin. He did not know he was on this and I am not sure what it was for.     Records from [facility] were reviewed. Although the diagnosis of DM2 is not listed on problem lists through [facility] or other health system, his  "previous PCP did diagnosis this back in March with A1C of 6.9. She had brought this up to him previously in a clinic visit for similar complaints as above. Per her note \"Discussed that his symptoms of sweating, dizziness, fatigue, nausea, abd pain, general malaise raise the question of the status of his diabetes. His A1C went from 5.9 in August of 2017 to 6.3 in March, 2018. He refused Metformin, lifestyle modifications, and checking his glucose on a regular basis. The patient became upset and states this \"has nothing to do with diabetes.\" He began to speak with vulgar language repeatedly and asked this provider to \"get the fuck out of the room.\"     Of note, recent hospitalization for lithium and seroquel overdoses in the last year per care everywhere.     Problem list and histories reviewed & adjusted, as indicated.  Additional history: as documented    Labs reviewed in EPIC    ROS:  Constitutional, HEENT, cardiovascular, pulmonary, gi and gu systems are negative, except as otherwise noted.    OBJECTIVE:     /86  Pulse 108  Temp 97.9  F (36.6  C) (Tympanic)  Wt 235 lb (106.6 kg)  SpO2 95%  BMI 33.72 kg/m2  Body mass index is 33.72 kg/(m^2).  GENERAL: healthy, alert and no distress  EYES: Eyes grossly normal to inspection, PERRL and conjunctivae and sclerae normal  HENT: normal cephalic/atraumatic, right ear: TM bulging with suppurative effusion, left ear: normal: no effusions, no erythema, normal landmarks, oropharxnx crowded and edematous. Uvula appears swollen and friable, with white plaque noted  NECK: no adenopathy, no asymmetry, masses, or scars and thyroid normal to palpation  RESP: lungs clear to auscultation - no rales, rhonchi or wheezes  CV: regular rate and rhythm, normal S1 S2, no S3 or S4, no murmur, click or rub, no peripheral edema   ABDOMEN: soft, nontender, no hepatosplenomegaly, no masses and bowel sounds normal  MS: no gross musculoskeletal defects noted  NEURO: Pt is somnolent, but " easily roused. He is irritable. Speech is slow, but clear.   PSYCH: judgement and insight impaired, appearance disheveled and wearing sunglasses and headphones and winter hat throughout interview.no eye contact.     Diagnostic Test Results:  Results for orders placed or performed in visit on 04/17/18   CBC with platelets and differential   Result Value Ref Range    WBC 11.3 (H) 4.0 - 11.0 10e9/L    RBC Count 5.55 4.4 - 5.9 10e12/L    Hemoglobin 16.3 13.3 - 17.7 g/dL    Hematocrit 45.4 40.0 - 53.0 %    MCV 82 78 - 100 fl    MCH 29.4 26.5 - 33.0 pg    MCHC 35.9 31.5 - 36.5 g/dL    RDW 13.3 10.0 - 15.0 %    Platelet Count 300 150 - 450 10e9/L    Diff Method Automated Method     % Neutrophils 61.6 %    % Lymphocytes 27.1 %    % Monocytes 6.1 %    % Eosinophils 2.3 %    % Basophils 0.9 %    % Immature Granulocytes 2.0 %    Nucleated RBCs 0 0 /100    Absolute Neutrophil 7.0 1.6 - 8.3 10e9/L    Absolute Lymphocytes 3.1 0.8 - 5.3 10e9/L    Absolute Monocytes 0.7 0.0 - 1.3 10e9/L    Absolute Eosinophils 0.3 0.0 - 0.7 10e9/L    Absolute Basophils 0.1 0.0 - 0.2 10e9/L    Abs Immature Granulocytes 0.2 0 - 0.4 10e9/L    Absolute Nucleated RBC 0.0    Comprehensive metabolic panel (BMP + Alb, Alk Phos, ALT, AST, Total. Bili, TP)   Result Value Ref Range    Sodium 134 133 - 144 mmol/L    Potassium 4.4 3.4 - 5.3 mmol/L    Chloride 100 94 - 109 mmol/L    Carbon Dioxide 25 20 - 32 mmol/L    Anion Gap 9 3 - 14 mmol/L    Glucose 400 (H) 70 - 99 mg/dL    Urea Nitrogen 7 7 - 30 mg/dL    Creatinine 0.62 (L) 0.66 - 1.25 mg/dL    GFR Estimate >90 >60 mL/min/1.7m2    GFR Estimate If Black >90 >60 mL/min/1.7m2    Calcium 9.1 8.5 - 10.1 mg/dL    Bilirubin Total 0.3 0.2 - 1.3 mg/dL    Albumin 3.8 3.4 - 5.0 g/dL    Protein Total 8.3 6.8 - 8.8 g/dL    Alkaline Phosphatase 105 40 - 150 U/L    ALT 59 0 - 70 U/L    AST 53 (H) 0 - 45 U/L   CRP, inflammation   Result Value Ref Range    CRP Inflammation 8.6 (H) 0.0 - 8.0 mg/L   Lipase   Result Value Ref  Range    Lipase 183 73 - 393 U/L   Lithium level   Result Value Ref Range    Lithium Level <0.20 (L) 0.60 - 1.20 mmol/L   Valproic acid   Result Value Ref Range    Valproic Acid Level 33 (L) 50 - 100 mg/L   UA reflex to Microscopic and Culture - [place]   Result Value Ref Range    Color Urine Light Yellow     Appearance Urine Clear     Glucose Urine >1000 (A) NEG^Negative mg/dL    Bilirubin Urine Negative NEG^Negative    Ketones Urine 10 (A) NEG^Negative mg/dL    Specific Gravity Urine 1.032 1.003 - 1.035    Blood Urine Negative NEG^Negative    pH Urine 6.0 4.7 - 8.0 pH    Protein Albumin Urine Negative NEG^Negative mg/dL    Urobilinogen mg/dL Normal 0.0 - 2.0 mg/dL    Nitrite Urine Negative NEG^Negative    Leukocyte Esterase Urine Negative NEG^Negative    Source Midstream Urine    Hemoglobin A1c   Result Value Ref Range    Hemoglobin A1C 8.3 (H) 0 - 6.4 %   Estimated Average Glucose   Result Value Ref Range    Estimated Average Glucose 192 mg/dL       ASSESSMENT/PLAN:     Schizoaffective disorder, bipolar type (H)  Pt currently on high dose Seroquel and Wellbutrin. Facility med list still included depakote and ativan, however, these were discontinued after last hosptialization. Spoke with facility. He was not taking depakote, but was still getting ativan. This was discontinued. He has follow up with Psychiatry here already scheduled.     Somnolence  Potentially from the ativan and seroquel combination. Not entirely confident he has not been getting the depakote at facility. Will check level as well as basic labwork.   - Valproic acid    Diarrhea of presumed infectious origin  Just completed Levaquin, recommend stool cx/cdiff.   - Clostridium difficile Toxin B PCR; Future  - Stool culture SSCE; Future    Nausea / Myalgia  Based on labwork, likely related to hyperglycemia, but cannot rule out c diff, other infectious cause. Stool cx as above.   - CBC with platelets and differential  - Comprehensive metabolic panel  (BMP + Alb, Alk Phos, ALT, AST, Total. Bili, TP)  - CRP, inflammation  - Lipase    Throat pain / Dysphagia, unspecified type  Thrush likely. Will have pt follow up with ENT for evaluation of ulcerations of the uvula.   - nystatin (MYCOSTATIN) 038200 UNIT/ML suspension; Take 5 mLs (500,000 Units) by mouth 4 times daily  Dispense: 60 mL; Refill: 0  - OTOLARYNGOLOGY REFERRAL  - nystatin (MYCOSTATIN) 164237 UNIT/ML suspension; Take 5 mLs (500,000 Units) by mouth 4 times daily  Dispense: 60 mL; Refill: 0    Suprapubic pain  Urine clear other than for glucose. Potentially related to cause of diarrhea.   - UA reflex to Microscopic and Culture - [place]    JANIE (obstructive sleep apnea)  Needs CPAP.   - SLEEP EVALUATION & MANAGEMENT REFERRAL - PEDIATRIC (AGE 2-17) -; Future    Recurrent acute suppurative otitis media of right ear without spontaneous rupture of tympanic membrane  Patient appears to have R AOM, however, just completed Levaquin and has diarrhea. Would like to rule out C diff prior to rx. Pt also will be referred to ENT. No evidence of otitis externa.     Type 2 diabetes mellitus without complication, without long-term current use of insulin (H)  Uncontrolled. Spoke with patient. Not open to metformin, but open to other medications. Does not have meter, etc.   - DIABETES EDUCATION REFERRAL ([place])    Kathy Anderson MD  [facility] [place]

## 2018-04-19 PROBLEM — E11.9 DIABETES MELLITUS, TYPE 2 (H): Status: ACTIVE | Noted: 2018-04-19

## 2018-04-19 RX ORDER — SUMATRIPTAN 25 MG/1
50 TABLET, FILM COATED ORAL
Qty: 30 TABLET | Status: ON HOLD | COMMUNITY
Start: 2018-04-19 | End: 2018-04-29

## 2018-04-24 ENCOUNTER — HOSPITAL ENCOUNTER (INPATIENT)
Facility: HOSPITAL | Age: 38
LOS: 5 days | Discharge: PSYCHIATRIC OR SUBSTANCE USE FACILITY | End: 2018-04-30
Attending: PHYSICIAN ASSISTANT | Admitting: PSYCHIATRY & NEUROLOGY
Payer: MEDICAID

## 2018-04-24 ENCOUNTER — APPOINTMENT (OUTPATIENT)
Dept: CT IMAGING | Facility: HOSPITAL | Age: 38
End: 2018-04-24
Attending: PHYSICIAN ASSISTANT
Payer: MEDICAID

## 2018-04-24 ENCOUNTER — TELEPHONE (OUTPATIENT)
Dept: BEHAVIORAL HEALTH | Facility: CLINIC | Age: 38
End: 2018-04-24

## 2018-04-24 DIAGNOSIS — R45.851 SUICIDAL IDEATION: ICD-10-CM

## 2018-04-24 DIAGNOSIS — E11.9 TYPE 2 DIABETES MELLITUS WITHOUT COMPLICATION, WITHOUT LONG-TERM CURRENT USE OF INSULIN (H): ICD-10-CM

## 2018-04-24 DIAGNOSIS — J45.20 INTERMITTENT ASTHMA WITHOUT COMPLICATION, UNSPECIFIED ASTHMA SEVERITY: ICD-10-CM

## 2018-04-24 DIAGNOSIS — K21.9 GASTROESOPHAGEAL REFLUX DISEASE, ESOPHAGITIS PRESENCE NOT SPECIFIED: ICD-10-CM

## 2018-04-24 DIAGNOSIS — F41.9 ANXIETY: ICD-10-CM

## 2018-04-24 DIAGNOSIS — J30.2 SEASONAL ALLERGIC RHINITIS, UNSPECIFIED CHRONICITY, UNSPECIFIED TRIGGER: ICD-10-CM

## 2018-04-24 DIAGNOSIS — F17.200 NICOTINE DEPENDENCE, UNCOMPLICATED, UNSPECIFIED NICOTINE PRODUCT TYPE: ICD-10-CM

## 2018-04-24 DIAGNOSIS — R51.9 INTRACTABLE HEADACHE, UNSPECIFIED CHRONICITY PATTERN, UNSPECIFIED HEADACHE TYPE: ICD-10-CM

## 2018-04-24 DIAGNOSIS — R73.9 HYPERGLYCEMIA: ICD-10-CM

## 2018-04-24 DIAGNOSIS — R11.0 NAUSEA: ICD-10-CM

## 2018-04-24 DIAGNOSIS — F31.9 BIPOLAR 1 DISORDER (H): Primary | ICD-10-CM

## 2018-04-24 LAB
ALBUMIN SERPL-MCNC: 3.6 G/DL (ref 3.4–5)
ALBUMIN SERPL-MCNC: 4.1 G/DL (ref 3.4–5)
ALBUMIN UR-MCNC: NEGATIVE MG/DL
ALP SERPL-CCNC: 117 U/L (ref 40–150)
ALP SERPL-CCNC: 96 U/L (ref 40–150)
ALT SERPL W P-5'-P-CCNC: 57 U/L (ref 0–70)
ALT SERPL W P-5'-P-CCNC: 70 U/L (ref 0–70)
AMPHETAMINES UR QL SCN: NEGATIVE
ANION GAP SERPL CALCULATED.3IONS-SCNC: 10 MMOL/L (ref 3–14)
ANION GAP SERPL CALCULATED.3IONS-SCNC: 18 MMOL/L (ref 3–14)
APAP SERPL-MCNC: <2 MG/L (ref 10–20)
APPEARANCE UR: CLEAR
AST SERPL W P-5'-P-CCNC: 43 U/L (ref 0–45)
AST SERPL W P-5'-P-CCNC: 53 U/L (ref 0–45)
BACTERIA #/AREA URNS HPF: ABNORMAL /HPF
BARBITURATES UR QL: NEGATIVE
BASE DEFICIT BLDV-SCNC: 2.3 MMOL/L
BASOPHILS # BLD AUTO: 0.1 10E9/L (ref 0–0.2)
BASOPHILS NFR BLD AUTO: 0.8 %
BENZODIAZ UR QL: NEGATIVE
BILIRUB SERPL-MCNC: 0.6 MG/DL (ref 0.2–1.3)
BILIRUB SERPL-MCNC: 0.6 MG/DL (ref 0.2–1.3)
BILIRUB UR QL STRIP: NEGATIVE
BUN SERPL-MCNC: 6 MG/DL (ref 7–30)
BUN SERPL-MCNC: 6 MG/DL (ref 7–30)
CALCIUM SERPL-MCNC: 8 MG/DL (ref 8.5–10.1)
CALCIUM SERPL-MCNC: 8.3 MG/DL (ref 8.5–10.1)
CANNABINOIDS UR QL SCN: NEGATIVE
CHLORIDE SERPL-SCNC: 105 MMOL/L (ref 94–109)
CHLORIDE SERPL-SCNC: 97 MMOL/L (ref 94–109)
CO2 SERPL-SCNC: 16 MMOL/L (ref 20–32)
CO2 SERPL-SCNC: 22 MMOL/L (ref 20–32)
COCAINE UR QL: NEGATIVE
COLOR UR AUTO: ABNORMAL
CREAT SERPL-MCNC: 0.59 MG/DL (ref 0.66–1.25)
CREAT SERPL-MCNC: 0.67 MG/DL (ref 0.66–1.25)
CRP SERPL-MCNC: 7 MG/L (ref 0–8)
DIFFERENTIAL METHOD BLD: ABNORMAL
EOSINOPHIL # BLD AUTO: 0.1 10E9/L (ref 0–0.7)
EOSINOPHIL NFR BLD AUTO: 0.9 %
ERYTHROCYTE [DISTWIDTH] IN BLOOD BY AUTOMATED COUNT: 12.9 % (ref 10–15)
ETHANOL SERPL-MCNC: 0.03 G/DL
GFR SERPL CREATININE-BSD FRML MDRD: >90 ML/MIN/1.7M2
GFR SERPL CREATININE-BSD FRML MDRD: >90 ML/MIN/1.7M2
GLUCOSE BLDC GLUCOMTR-MCNC: 212 MG/DL (ref 70–99)
GLUCOSE BLDC GLUCOMTR-MCNC: 268 MG/DL (ref 70–99)
GLUCOSE SERPL-MCNC: 243 MG/DL (ref 70–99)
GLUCOSE SERPL-MCNC: 427 MG/DL (ref 70–99)
GLUCOSE UR STRIP-MCNC: >1000 MG/DL
HCO3 BLDV-SCNC: 21 MMOL/L (ref 21–28)
HCT VFR BLD AUTO: 45.9 % (ref 40–53)
HGB BLD-MCNC: 16.3 G/DL (ref 13.3–17.7)
HGB UR QL STRIP: ABNORMAL
IMM GRANULOCYTES # BLD: 0.3 10E9/L (ref 0–0.4)
IMM GRANULOCYTES NFR BLD: 1.7 %
KETONES BLD-SCNC: 0.8 MMOL/L (ref 0–0.6)
KETONES BLD-SCNC: 0.8 MMOL/L (ref 0–0.6)
KETONES UR STRIP-MCNC: 40 MG/DL
LEUKOCYTE ESTERASE UR QL STRIP: NEGATIVE
LIPASE SERPL-CCNC: 152 U/L (ref 73–393)
LYMPHOCYTES # BLD AUTO: 4 10E9/L (ref 0.8–5.3)
LYMPHOCYTES NFR BLD AUTO: 27.7 %
MCH RBC QN AUTO: 29.2 PG (ref 26.5–33)
MCHC RBC AUTO-ENTMCNC: 35.5 G/DL (ref 31.5–36.5)
MCV RBC AUTO: 82 FL (ref 78–100)
METHADONE UR QL SCN: NEGATIVE
MONOCYTES # BLD AUTO: 0.9 10E9/L (ref 0–1.3)
MONOCYTES NFR BLD AUTO: 6.2 %
NEUTROPHILS # BLD AUTO: 9.1 10E9/L (ref 1.6–8.3)
NEUTROPHILS NFR BLD AUTO: 62.7 %
NITRATE UR QL: NEGATIVE
NRBC # BLD AUTO: 0 10*3/UL
NRBC BLD AUTO-RTO: 0 /100
O2/TOTAL GAS SETTING VFR VENT: ABNORMAL %
OPIATES UR QL SCN: NEGATIVE
OXYHGB MFR BLDV: 95 %
PCO2 BLDV: 36 MM HG (ref 40–50)
PCP UR QL SCN: NEGATIVE
PH BLDV: 7.4 PH (ref 7.32–7.43)
PH UR STRIP: 5 PH (ref 4.7–8)
PLATELET # BLD AUTO: 338 10E9/L (ref 150–450)
PO2 BLDV: 93 MM HG (ref 25–47)
POTASSIUM SERPL-SCNC: 3.7 MMOL/L (ref 3.4–5.3)
POTASSIUM SERPL-SCNC: 3.8 MMOL/L (ref 3.4–5.3)
PROT SERPL-MCNC: 7.1 G/DL (ref 6.8–8.8)
PROT SERPL-MCNC: 8.4 G/DL (ref 6.8–8.8)
RBC # BLD AUTO: 5.58 10E12/L (ref 4.4–5.9)
RBC #/AREA URNS AUTO: 1 /HPF (ref 0–2)
SALICYLATES SERPL-MCNC: 2 MG/DL
SODIUM SERPL-SCNC: 131 MMOL/L (ref 133–144)
SODIUM SERPL-SCNC: 137 MMOL/L (ref 133–144)
SOURCE: ABNORMAL
SP GR UR STRIP: 1.02 (ref 1–1.03)
TSH SERPL DL<=0.005 MIU/L-ACNC: 0.94 MU/L (ref 0.4–4)
UROBILINOGEN UR STRIP-MCNC: NORMAL MG/DL (ref 0–2)
WBC # BLD AUTO: 14.5 10E9/L (ref 4–11)
WBC #/AREA URNS AUTO: <1 /HPF (ref 0–5)

## 2018-04-24 PROCEDURE — 96375 TX/PRO/DX INJ NEW DRUG ADDON: CPT

## 2018-04-24 PROCEDURE — 25000131 ZZH RX MED GY IP 250 OP 636 PS 637: Performed by: PHYSICIAN ASSISTANT

## 2018-04-24 PROCEDURE — 86140 C-REACTIVE PROTEIN: CPT | Performed by: PHYSICIAN ASSISTANT

## 2018-04-24 PROCEDURE — 25000125 ZZHC RX 250: Performed by: PHYSICIAN ASSISTANT

## 2018-04-24 PROCEDURE — 36415 COLL VENOUS BLD VENIPUNCTURE: CPT | Performed by: PHYSICIAN ASSISTANT

## 2018-04-24 PROCEDURE — 74177 CT ABD & PELVIS W/CONTRAST: CPT | Mod: TC

## 2018-04-24 PROCEDURE — 80329 ANALGESICS NON-OPIOID 1 OR 2: CPT | Performed by: PHYSICIAN ASSISTANT

## 2018-04-24 PROCEDURE — 99285 EMERGENCY DEPT VISIT HI MDM: CPT | Performed by: PHYSICIAN ASSISTANT

## 2018-04-24 PROCEDURE — 00000146 ZZHCL STATISTIC GLUCOSE BY METER IP

## 2018-04-24 PROCEDURE — 96374 THER/PROPH/DIAG INJ IV PUSH: CPT

## 2018-04-24 PROCEDURE — 82010 KETONE BODYS QUAN: CPT | Performed by: PHYSICIAN ASSISTANT

## 2018-04-24 PROCEDURE — 84443 ASSAY THYROID STIM HORMONE: CPT | Performed by: PHYSICIAN ASSISTANT

## 2018-04-24 PROCEDURE — 99285 EMERGENCY DEPT VISIT HI MDM: CPT | Mod: 25

## 2018-04-24 PROCEDURE — 80053 COMPREHEN METABOLIC PANEL: CPT | Performed by: PHYSICIAN ASSISTANT

## 2018-04-24 PROCEDURE — 25000132 ZZH RX MED GY IP 250 OP 250 PS 637: Performed by: PHYSICIAN ASSISTANT

## 2018-04-24 PROCEDURE — 96361 HYDRATE IV INFUSION ADD-ON: CPT

## 2018-04-24 PROCEDURE — 25000132 ZZH RX MED GY IP 250 OP 250 PS 637

## 2018-04-24 PROCEDURE — 80307 DRUG TEST PRSMV CHEM ANLYZR: CPT | Performed by: PHYSICIAN ASSISTANT

## 2018-04-24 PROCEDURE — 25000128 H RX IP 250 OP 636: Performed by: PHYSICIAN ASSISTANT

## 2018-04-24 PROCEDURE — 83690 ASSAY OF LIPASE: CPT | Performed by: PHYSICIAN ASSISTANT

## 2018-04-24 PROCEDURE — 82805 BLOOD GASES W/O2 SATURATION: CPT | Performed by: PHYSICIAN ASSISTANT

## 2018-04-24 PROCEDURE — 81001 URINALYSIS AUTO W/SCOPE: CPT | Performed by: PHYSICIAN ASSISTANT

## 2018-04-24 PROCEDURE — 25000128 H RX IP 250 OP 636: Performed by: RADIOLOGY

## 2018-04-24 PROCEDURE — 80320 DRUG SCREEN QUANTALCOHOLS: CPT | Performed by: PHYSICIAN ASSISTANT

## 2018-04-24 PROCEDURE — 85025 COMPLETE CBC W/AUTO DIFF WBC: CPT | Performed by: PHYSICIAN ASSISTANT

## 2018-04-24 RX ORDER — LORAZEPAM 2 MG/ML
INJECTION INTRAMUSCULAR
Status: DISCONTINUED
Start: 2018-04-24 | End: 2018-04-25 | Stop reason: HOSPADM

## 2018-04-24 RX ORDER — GLYBURIDE 5 MG/1
5 TABLET ORAL
Status: DISCONTINUED | OUTPATIENT
Start: 2018-04-24 | End: 2018-04-30 | Stop reason: HOSPADM

## 2018-04-24 RX ORDER — QUETIAPINE FUMARATE 200 MG/1
TABLET, FILM COATED ORAL
Status: COMPLETED
Start: 2018-04-24 | End: 2018-04-24

## 2018-04-24 RX ORDER — QUETIAPINE FUMARATE 200 MG/1
800 TABLET, FILM COATED ORAL AT BEDTIME
Status: DISCONTINUED | OUTPATIENT
Start: 2018-04-24 | End: 2018-04-30 | Stop reason: HOSPADM

## 2018-04-24 RX ORDER — IOPAMIDOL 612 MG/ML
100 INJECTION, SOLUTION INTRAVASCULAR ONCE
Status: COMPLETED | OUTPATIENT
Start: 2018-04-24 | End: 2018-04-24

## 2018-04-24 RX ORDER — LORAZEPAM 2 MG/ML
2 INJECTION INTRAMUSCULAR ONCE
Status: COMPLETED | OUTPATIENT
Start: 2018-04-24 | End: 2018-04-24

## 2018-04-24 RX ORDER — NICOTINE POLACRILEX 4 MG
15-30 LOZENGE BUCCAL
Status: DISCONTINUED | OUTPATIENT
Start: 2018-04-24 | End: 2018-04-30 | Stop reason: HOSPADM

## 2018-04-24 RX ORDER — DEXTROSE MONOHYDRATE 25 G/50ML
25-50 INJECTION, SOLUTION INTRAVENOUS
Status: DISCONTINUED | OUTPATIENT
Start: 2018-04-24 | End: 2018-04-30 | Stop reason: HOSPADM

## 2018-04-24 RX ORDER — POTASSIUM CHLORIDE 1500 MG/1
40 TABLET, EXTENDED RELEASE ORAL ONCE
Status: COMPLETED | OUTPATIENT
Start: 2018-04-24 | End: 2018-04-24

## 2018-04-24 RX ADMIN — SODIUM CHLORIDE 1000 ML: 9 INJECTION, SOLUTION INTRAVENOUS at 15:04

## 2018-04-24 RX ADMIN — IOPAMIDOL 100 ML: 612 INJECTION, SOLUTION INTRAVENOUS at 15:20

## 2018-04-24 RX ADMIN — INSULIN HUMAN 5 UNITS: 100 INJECTION, SOLUTION PARENTERAL at 15:05

## 2018-04-24 RX ADMIN — FAMOTIDINE 20 MG: 20 INJECTION, SOLUTION INTRAVENOUS at 15:07

## 2018-04-24 RX ADMIN — QUETIAPINE FUMARATE 800 MG: 200 TABLET, FILM COATED ORAL at 23:28

## 2018-04-24 RX ADMIN — SODIUM CHLORIDE 1000 ML: 9 INJECTION, SOLUTION INTRAVENOUS at 16:58

## 2018-04-24 RX ADMIN — POTASSIUM CHLORIDE 40 MEQ: 1500 TABLET, EXTENDED RELEASE ORAL at 16:59

## 2018-04-24 RX ADMIN — GLYBURIDE 5 MG: 5 TABLET ORAL at 19:21

## 2018-04-24 RX ADMIN — LORAZEPAM 2 MG: 2 INJECTION, SOLUTION INTRAMUSCULAR; INTRAVENOUS at 14:17

## 2018-04-24 ASSESSMENT — ENCOUNTER SYMPTOMS
HEADACHES: 0
DYSURIA: 0
NEUROLOGICAL NEGATIVE: 1
HEMATURIA: 0
CONSTIPATION: 0
SHORTNESS OF BREATH: 0
MUSCULOSKELETAL NEGATIVE: 1
UNEXPECTED WEIGHT CHANGE: 0
CHILLS: 0
RECTAL PAIN: 0
DIARRHEA: 0
BLOOD IN STOOL: 0
SORE THROAT: 0
APPETITE CHANGE: 0
ANAL BLEEDING: 0
VOMITING: 0
ABDOMINAL PAIN: 1
FEVER: 0
ABDOMINAL DISTENTION: 0
NAUSEA: 0

## 2018-04-24 NOTE — ED NOTES
Pt said he doesn't want to go to [facility].  He has been to [facility] in [place].  Would want to go there instead.

## 2018-04-24 NOTE — ED PROVIDER NOTES
History     Chief Complaint   Patient presents with     Psychiatric Evaluation     held knife to throat last night     HPI  Ar Irby III is a 37 year old male who presents with SI and severe depression. He held a knife to his own throat last night. His father took the knife from him and brought him here for evaluation. He states he has lost 9 family members and two close ex-girlfriends recently. He states his current girlfriend has a heart transplant and isn't expected to live > 6 months. These deaths and his girlfrien'd illnesses have caused his SI. He states he wants to be with those that have passed and the only way to do this is by killing himself. Ar has h/o bipolar I, schizoaffective disorder, type II diabetes, and sleep apnea. He lives at [facility] in [place], board and [facility]. He was on metformin for his diabetes but stopped this himself about 1 year ago. He just established care with Dr. Anderson at our clinic and was seen on 4/17/18. She placed a diabetes referral for him. He was not willing to start back on Metformin.   He also c/o upper abdominal pain, worse with bending over to tie his shoes x 1 month. Denies fevers/chills or n/v/d.     Problem List:    Patient Active Problem List    Diagnosis Date Noted     Suicidal intent 04/25/2018     Priority: Medium     Diabetes mellitus, type 2 (H) 04/19/2018     Priority: Medium     Suicidal ideation 04/03/2018     Priority: Medium     Gastroesophageal reflux 02/07/2018     Priority: Medium     Intellectual disability 02/07/2018     Priority: Medium     Schizoaffective disorder (H) 02/07/2018     Priority: Medium     Central sleep apnea 08/19/2015     Priority: Medium     Asthma 06/27/2013     Priority: Medium     Bipolar 1 disorder (H) 06/27/2013     Priority: Medium     Insomnia 06/27/2013     Priority: Medium     JANIE on CPAP 05/22/2013     Priority: Medium     Overview:   Severe. CPAP 11 cmH20, full face mask. DME: [facility].        "Dyslipidemia 01/26/2013     Priority: Medium     Closed fracture of ankle 01/21/2013     Priority: Medium     Overview:   IMO Update       Chronic mental illness 01/21/2013     Priority: Medium        Past Medical History:    History reviewed. No pertinent past medical history.    Past Surgical History:    Past Surgical History:   Procedure Laterality Date     BACK SURGERY         Family History:    Family History   Problem Relation Age of Onset     Unknown/Adopted Mother      Unknown/Adopted Father        Social History:  Marital Status:  Single [1]  Social History   Substance Use Topics     Smoking status: Current Every Day Smoker     Packs/day: 0.50     Years: 12.00     Types: Cigarettes     Smokeless tobacco: Never Used      Comment: tried to quit, no passive exposure, longest tobacco free: 1year     Alcohol use Yes      Comment: 5 beers occasionally        Medications:      No current outpatient prescriptions on file.      Review of Systems   Constitutional: Negative for appetite change, chills, fever and unexpected weight change.   HENT: Negative for sore throat.    Respiratory: Negative for shortness of breath.    Cardiovascular: Negative for chest pain.   Gastrointestinal: Positive for abdominal pain. Negative for abdominal distention, anal bleeding, blood in stool, constipation, diarrhea, nausea, rectal pain and vomiting.   Genitourinary: Negative.  Negative for dysuria, hematuria, scrotal swelling and testicular pain.   Musculoskeletal: Negative.    Skin: Negative.    Neurological: Negative.  Negative for headaches.   Psychiatric/Behavioral: Positive for suicidal ideas. Negative for self-injury.   All other systems reviewed and are negative.      Physical Exam   BP: 146/96  Pulse: 120  Heart Rate: 115  Temp: 98.9  F (37.2  C)  Resp: 17  Height: 177.8 cm (5' 10\")  Weight: 104.8 kg (231 lb)  SpO2: 96 %      Physical Exam   Constitutional: He is oriented to person, place, and time. He appears well-developed " and well-nourished.  Non-toxic appearance. He does not have a sickly appearance. He does not appear ill. No distress.   HENT:   Head: Normocephalic and atraumatic.   Nose: Nose normal.   Mouth/Throat: Oropharynx is clear and moist. No oropharyngeal exudate.   Eyes: Conjunctivae are normal. Pupils are equal, round, and reactive to light. Right eye exhibits no discharge. Left eye exhibits no discharge. No scleral icterus.   Neck: Normal range of motion. Neck supple.   Cardiovascular: Normal rate, regular rhythm, normal heart sounds and intact distal pulses.  Exam reveals no gallop and no friction rub.    No murmur heard.  Pulmonary/Chest: Effort normal and breath sounds normal. No respiratory distress. He has no wheezes. He has no rales.   Abdominal: Soft. Bowel sounds are normal. He exhibits no distension and no mass. There is generalized tenderness. There is no rebound and no guarding.   Musculoskeletal: Normal range of motion. He exhibits no edema.   Lymphadenopathy:     He has no cervical adenopathy.   Neurological: He is alert and oriented to person, place, and time. No cranial nerve deficit. Coordination normal.   Skin: Skin is warm and dry. No rash noted. He is not diaphoretic. No erythema. No pallor.   Psychiatric: His speech is normal and behavior is normal. Judgment normal. His mood appears anxious. Cognition and memory are normal. He exhibits a depressed mood. He expresses suicidal ideation. He expresses suicidal plans.   Tearful.    Nursing note and vitals reviewed.      ED Course     ED Course     Procedures            Results for orders placed or performed during the hospital encounter of 04/24/18 (from the past 24 hour(s))   Glucose by meter   Result Value Ref Range    Glucose 349 (H) 70 - 99 mg/dL   Glucose by meter   Result Value Ref Range    Glucose 311 (H) 70 - 99 mg/dL   Glucose by meter   Result Value Ref Range    Glucose 300 (H) 70 - 99 mg/dL   Glucose by meter   Result Value Ref Range    Glucose  330 (H) 70 - 99 mg/dL       Medications   glyBURIDE (DIABETA /MICRONASE) tablet 5 mg (5 mg Oral Given 4/25/18 1713)   glucose gel 15-30 g (not administered)     Or   dextrose 50 % injection 25-50 mL (not administered)     Or   glucagon injection 1 mg (not administered)   insulin aspart (NovoLOG) inj (RAPID ACTING) (4 Units Subcutaneous Given 4/26/18 1228)   QUEtiapine (SEROquel) tablet 800 mg (800 mg Oral Given 4/25/18 2205)   hydrOXYzine (ATARAX) tablet 25-50 mg (50 mg Oral Given 4/25/18 1436)   acetaminophen (TYLENOL) tablet 650 mg (650 mg Oral Given 4/26/18 0749)   magnesium hydroxide (MILK OF MAGNESIA) suspension 30 mL (not administered)   traZODone (DESYREL) tablet 50 mg (not administered)   OLANZapine (zyPREXA) tablet 10 mg (not administered)     Or   OLANZapine (zyPREXA) injection 10 mg (not administered)   nicotine polacrilex (NICORETTE) gum 2-4 mg (not administered)   albuterol (PROAIR HFA/PROVENTIL HFA/VENTOLIN HFA) Inhaler 2 puff (not administered)   buPROPion (WELLBUTRIN XL) 24 hr tablet 150 mg (150 mg Oral Given 4/26/18 0845)   fluticasone (FLONASE) 50 MCG/ACT spray 1 spray (1 spray Both Nostrils Not Given 4/26/18 0849)   loratadine (CLARITIN) tablet 10 mg (10 mg Oral Given 4/26/18 0844)   melatonin 3 mg (with vit B6 10 mg) extended release tablet 1 tablet (1 tablet Oral Given 4/25/18 2206)   pantoprazole (PROTONIX) EC tablet 40 mg (40 mg Oral Given 4/26/18 0633)   promethazine (PHENERGAN) tablet 25 mg (not administered)   SUMAtriptan (IMITREX) tablet 50 mg (not administered)   alum & mag hydroxide-simethicone (MYLANTA ES/MAALOX  ES) suspension 30 mL (30 mLs Oral Given 4/26/18 0750)   magic mouthwash suspension (diphenhydrAMINE, lidocaine, aluminum-magnesium & simethicone) (not administered)   LORazepam (ATIVAN) injection 2 mg (2 mg Intramuscular Given 4/24/18 8757)   0.9% sodium chloride BOLUS (0 mLs Intravenous Stopped 4/24/18 1611)   famotidine (PEPCID) infusion 20 mg (0 mg Intravenous Stopped  4/24/18 1520)   insulin (regular) (HumuLIN R/NovoLIN R) injection 5 Units (5 Units Intravenous Given 4/24/18 1505)   sodium chloride (PF) 0.9% PF flush 60 mL (60 mLs Intravenous Given 4/24/18 1520)   iopamidol (ISOVUE-300) IV solution 61% 100 mL (100 mLs Intravenous Given 4/24/18 1520)   potassium chloride SA (K-DUR/KLOR-CON M) CR tablet 40 mEq (40 mEq Oral Given 4/24/18 1659)   0.9% sodium chloride BOLUS (0 mLs Intravenous Stopped 4/24/18 1800)       Assessments & Plan (with Medical Decision Making)   Ar presents with SI with plan to stab himself in the neck. He is very tearful and depressed. He certainly needs inpatient psych admission.   As a consequence of him being non-compliant with his Metformin x 1 year, he was hyperglycemic today at 427 and acidotic with anion gap elevated at 18 and carbon dioxide of 16. He also had abdominal tenderness and a slightly elevated white count at 14,000. A CT abd/pelvis with IV contrast was normal. His hyperglycemia/acidosis was treated with Regular Insulin 5 units IV, 2 liters of NS, and Kdur 40meq PO to avoid potassium losses. His acidosis normalized and glucose decreased to 243 following. I consulted with our hospitalist Dr. Mitchell and Dr. Brown who agree he is now medically cleared for behavioral health placement. Dr. Mitchell recommended starting him on Glipizide 5mg daily and initiating sliding scale insulin. He was given his first dose of Glipizide 5mg PO in our ED at 1900 with his dinner.     Our behavioral health floor is unable to accomodate Ar castillo due to too many aggressive males on the floor. I spoke with Mary Ann, behavioral healthy manager, and she does feel they can likely accommodate pt in the AM up on our psych floor.   His blood glucose at 2130 is 212. Sliding scale insulin to be given prior to meals tomorrow was ordered. Timed repeat BMP and ketone quant was ordered for 0700 tomorrow. He was signed out to Dr. Rea at the end of my shift  at 2200.      I have reviewed the nursing notes.    I have reviewed the findings, diagnosis, plan and need for follow up with the patient.    Current Discharge Medication List          Final diagnoses:   Hyperglycemia   Suicidal ideation       4/24/2018   [facility]     Laverne Rowley PA-C  04/24/18 1941       Laverne Rowley PA-C  04/24/18 [id number]       Laverne Rowley PA-C  04/26/18 1311

## 2018-04-24 NOTE — IP AVS SNAPSHOT
[facility]    [address] [place] [id number]    Phone:  [phone]    Fax:  [phone]                                       After Visit Summary   4/24/2018    Ar Irby III    MRN: [id number]           After Visit Summary Signature Page     I have received my discharge instructions, and my questions have been answered. I have discussed any challenges I see with this plan with the nurse or doctor.    ..........................................................................................................................................  Patient/Patient Representative Signature      ..........................................................................................................................................  Patient Representative Print Name and Relationship to Patient    ..................................................               ................................................  Date                                            Time    ..........................................................................................................................................  Reviewed by Signature/Title    ...................................................              ..............................................  Date                                                            Time

## 2018-04-24 NOTE — ED NOTES
Pt with significant sleep apnea causing saturations to drop to 85%. HERON FOURNIER notified, O2 started at 2 L/NC>

## 2018-04-24 NOTE — IP AVS SNAPSHOT
MRN:[id number]                      After Visit Summary   4/24/2018    Ar Irby III    MRN: [id number]           Thank you!     Thank you for choosing [facility] for your care. Our goal is always to provide you with excellent care. Hearing back from our patients is one way we can continue to improve our services. Please take a few minutes to complete the written survey that you may receive in the mail after you visit with us. Thank you!        Patient Information     Date Of Birth          1980        About your hospital stay     You were admitted on:  April 25, 2018 You last received care in the: [facility]    You were discharged on:  April 30, 2018       Who to Call     For medical emergencies, please call 911.  For non-urgent questions about your medical care, please call your primary care provider or clinic, [phone]          Attending Provider     Provider Specialty    Laverne Rowley PA-C Emergency Medicine    Romy Chinchilla MD [facility], TOMEKA Palmer CNP Nurse Practitioner       Primary Care Provider Office Phone # Fax #    Kathy Anderson [phone]      Your next 10 appointments already scheduled     May 10, 2018 11:00 AM CDT   (Arrive by 10:45 AM)   New Visit with TOMEKA Ponce [facility] ([facility] [place] )    [address] [place] [address]  [place] [address] [phone]            May 10, 2018  2:00 PM CDT   (Arrive by 1:45 PM)   New Visit with Tori Newman PA-C   [facility] ([facility] [place] )    [address]  [place] [address]   [phone]            May 11, 2018  1:30 PM CDT   (Arrive by 1:15 PM)   SHORT with Kathy Anderson MD   [facility] ([facility] [place] )    [address]  [place] [address]   [phone]            May 15, 2018 10:30 AM CDT   New Sleep Patient with Juice Beaulieu,  MD [facility] ([facility] )    [address] [address]   [phone]              Further instructions from your care team       Behavioral Discharge Planning and Instructions    Summary: Ar was admitted to [address] [place] [address] for suicidal ideation    Main Diagnosis: Schizoaffective disorder, bipolar type    Major Treatments, Procedures and Findings: Stabilize with medications, connect with community programs.    Symptoms to Report: feeling more aggressive, increased confusion, losing more sleep, mood getting worse or thoughts of suicide    Lifestyle Adjustment: Take all medications as prescribed, meet with doctor/ medication provider, out patient therapist, , and [facility] worker as scheduled. Abstain from alcohol or any unprescribed drugs.    Psychiatry Follow-up:   [facility]: Medication Management: Melchor Sandoval NP Thursday, May 10th @ 10:45 am  PCP: Jessica Dye Friday, May 11, 1:15 pm  [address] [address]  Phone: [phone]  Fax: [phone]      [facility] [place]  [address] [place] [address]  Phone: [phone]  Fax: [phone]      [facility]:   [address] [place] [address]  Phone: [phone]  Fax: [phone]      [facility]:  Libertad Tao  [address] [address]  Phone:  [phone]   Fax - [phone]      [facility]: Call or go to if you are in crisis  [address] [place]  [address]  Phone - [phone]  Fax - [phone]        Resources:   Crisis Intervention: 531.223.6312 or 820-588-0327 (TTY: 841.604.3421).  Call anytime for help.  National [place] on Mental Illness (www.[place].nagi.org): 876.384.9214 or 766-257-0783.  Alcoholics Anonymous (www.alcoholics-anonymous.org): Check your phone book for your local chapter.  Suicide Awareness Voices of Education (SAVE) (www.save.org): 577-673-IHMU (8470)  National Suicide  Prevention Line (www.mentalhealthmn.org): 529-809-EWMT (4413)  Mental Health Consumer/Survivor Network of [place] (www.mhcsn.net): 829.965.9816 or 724-092-3580  Mental Health Association of [place] (www.mentalhealth.org): 837.945.8135 or 560-424-9156    General Medication Instructions:   See your medication sheet(s) for instructions.   Take all medicines as directed.  Make no changes unless your doctor suggests them.   Go to all your doctor visits.  Be sure to have all your required lab tests. This way, your medicines can be refilled on time.  Do not use any drugs not prescribed by your doctor.  Avoid alcohol.    [place] Area:  [facility], Crisis stabilization [facility]- [phone]  [facility] Crisis Line: 1-364.400.9643  [employer]ates For Family Peace: 913-6251  Sexual Assault Program [facility]: [phone] or 1-566.256.3185  [facility] Women[facility] Program: 1-306.186.3701 Ext: 279       Calls answered Mon-Fri-8:00 am--4:30 pm    [place]:  [employer] for Family Peace: 7-547-779-5259  [facility] first call for help: 4-888-166-9104  [facility]:  (733) 936-6322      [place] Area:  Warm Line: 1-834.757.7449       Calls answered Tuesday--Saturday 4:00 pm--10:00 pm  [facility] Crisis Line - 725.616.6872  [facility] 226-877-0213    [place] Statewide:  [place] Crisis and Referral Services: 7-176-866-1179  National Suicide Prevention Lifeline: 5-320-939-TALK (5220)   - kut4vvfr- Text  Life  to [address]  First Call for Help: 2-1-1  [facility] Helpline- 3-561-XRTF-HELP      Pending Results     No orders found from 4/22/2018 to 4/25/2018.            Statement of Approval     Ordered          04/29/18 1123  I have reviewed and agree with all the recommendations and orders detailed in this document.  EFFECTIVE NOW     Approved and electronically signed by:  Granberg, Xena, NP             Admission Information     Date & Time Provider Department Dept. Phone    4/24/2018 Jackelin Hollins,  "APRN CNP [facility] [phone]      Your Vitals Were     Blood Pressure Pulse Temperature Respirations Height Weight    119/75 89 97.4  F (36.3  C) (Tympanic) 16 1.778 m (5' 10\") 107.3 kg (236 lb 9.6 oz)    Pulse Oximetry BMI (Body Mass Index)                99% 33.95 kg/m2          [email or web address] Information     [email or web address] lets you send messages to your doctor, view your test results, renew your prescriptions, schedule appointments and more. To sign up, go to [email or web address] . Click on \"Log in\" on the left side of the screen, which will take you to the Welcome page. Then click on \"Sign up Now\" on the right side of the page.     You will be asked to enter the access code listed below, as well as some personal information. Please follow the directions to create your username and password.     Your access code is: [email or web address]  Expires: 2018 12:02 PM     Your access code will  in 90 days. If you need help or a new code, please call your [facility] clinic or [phone].        Care EveryWhere ID     This is your Care EveryWhere ID. This could be used by other organizations to access your [facility] medical records  [id number]        Equal Access to Services     PROMISE AMADOR : Hadii farnaz Ro, waaxda luqadaha, qaybta kaalmada adeegyada, rolando izquierdo . So [facility] [phone].    ATENCIÓN: Si habla español, tiene a glaser disposición servicios gratuitos de asistencia lingüística. Llame al [phone].    We comply with applicable federal civil rights laws and [place] laws. We do not discriminate on the basis of race, color, national origin, age, disability, sex, sexual orientation, or gender identity.               Review of your medicines      START taking        Dose / Directions    gabapentin 300 MG capsule   Commonly known as:  NEURONTIN   Used for:  Bipolar 1 disorder (H)        Dose:  300 mg   Take 1 capsule (300 mg) by mouth 3 times daily   Quantity:  90 " capsule   Refills:  0       glyBURIDE 5 MG tablet   Commonly known as:  DIABETA /MICRONASE   Used for:  Type 2 diabetes mellitus without complication, without long-term current use of insulin (H)        Dose:  5 mg   Take 1 tablet (5 mg) by mouth daily (with dinner)   Quantity:  30 tablet   Refills:  0         CONTINUE these medicines which may have CHANGED, or have new prescriptions. If we are uncertain of the size of tablets/capsules you have at home, strength may be listed as something that might have changed.        Dose / Directions    albuterol 108 (90 Base) MCG/ACT Inhaler   Commonly known as:  VENTOLIN HFA   This may have changed:    - when to take this  - reasons to take this   Used for:  Intermittent asthma without complication, unspecified asthma severity        Dose:  2 puff   Inhale 2 puffs into the lungs every 4 hours as needed for shortness of breath / dyspnea or wheezing Every 4-6 hours as needed   Quantity:  1 Inhaler   Refills:  0       buPROPion 300 MG 24 hr tablet   Commonly known as:  WELLBUTRIN XL   This may have changed:    - medication strength  - how much to take   Used for:  Bipolar 1 disorder (H)        Dose:  300 mg   Take 1 tablet (300 mg) by mouth daily   Quantity:  30 tablet   Refills:  0       fluticasone 50 MCG/ACT spray   Commonly known as:  FLONASE   This may have changed:    - how much to take  - how to take this  - when to take this  - reasons to take this   Used for:  Seasonal allergic rhinitis, unspecified chronicity, unspecified trigger        Dose:  2 spray   Spray 2 sprays into both nostrils daily as needed for rhinitis or allergies   Quantity:  1 Bottle   Refills:  0       melatonin 3 MG Caps   This may have changed:    - medication strength  - how much to take  - additional instructions   Used for:  Bipolar 1 disorder (H)        Dose:  9 mg   Take 9 mg by mouth At Bedtime (3 tablets)   Quantity:  90 capsule   Refills:  0       pantoprazole 40 MG EC tablet   Commonly known  as:  PROTONIX   This may have changed:    - medication strength  - when to take this   Used for:  Gastroesophageal reflux disease, esophagitis presence not specified        Dose:  40 mg   Take 1 tablet (40 mg) by mouth daily   Quantity:  30 tablet   Refills:  0         CONTINUE these medicines which have NOT CHANGED        Dose / Directions    hydrOXYzine 25 MG tablet   Commonly known as:  ATARAX   Used for:  Anxiety        Dose:  25-50 mg   Take 1-2 tablets (25-50 mg) by mouth every 4 hours as needed for anxiety   Quantity:  90 tablet   Refills:  0       lidocaine visc 2% 2.5mL/5mL & maalox/mylanta w/ simeth 2.5mL/5mL & diphenhydrAMINE 5mg/5mL Susp suspension   Commonly known as:  [facility]IC Mouthwash [facility]        Dose:  10 mL   Swish and swallow 10 mLs in mouth every 6 hours as needed for mouth sores   Refills:  0       loratadine 10 MG tablet   Commonly known as:  CLARITIN   Used for:  Intermittent asthma without complication, unspecified asthma severity        Dose:  10 mg   Take 1 tablet (10 mg) by mouth daily   Quantity:  30 tablet   Refills:  0       nicotine 21 MG/24HR 24 hr patch   Commonly known as:  NICODERM CQ   Used for:  Nicotine dependence, uncomplicated, unspecified nicotine product type        Dose:  1 patch   Place 1 patch onto the skin every 24 hours   Quantity:  28 patch   Refills:  0       promethazine 25 MG tablet   Commonly known as:  PHENERGAN   Used for:  Nausea        Dose:  25 mg   Take 1 tablet (25 mg) by mouth every 6 hours as needed for nausea   Quantity:  30 tablet   Refills:  0       QUEtiapine 400 MG tablet   Commonly known as:  SEROquel   Used for:  Bipolar 1 disorder (H)        Dose:  800 mg   Take 2 tablets (800 mg) by mouth At Bedtime   Quantity:  60 tablet   Refills:  0       SUMAtriptan 25 MG tablet   Commonly known as:  IMITREX   Used for:  Intractable headache, unspecified chronicity pattern, unspecified headache type        Dose:  50 mg   Take 2 tablets (50 mg) by mouth at  onset of headache Max two doses in 24 hour period   Quantity:  14 tablet   Refills:  0         STOP taking     nystatin [id number] UNIT/ML suspension   Commonly known as:  MYCOSTATIN                Where to get your medicines      These medications were sent to [facility] [place], [place] - [address]  [address] [place] [address]     Phone:  [phone]     albuterol 108 (90 Base) MCG/ACT Inhaler    buPROPion 300 MG 24 hr tablet    fluticasone 50 MCG/ACT spray    gabapentin 300 MG capsule    glyBURIDE 5 MG tablet    hydrOXYzine 25 MG tablet    loratadine 10 MG tablet    melatonin 3 MG Caps    nicotine 21 MG/24HR 24 hr patch    pantoprazole 40 MG EC tablet    promethazine 25 MG tablet    QUEtiapine 400 MG tablet    SUMAtriptan 25 MG tablet                Protect others around you: Learn how to safely use, store and throw away your medicines at www.disposemymeds.org.             Medication List: This is a list of all your medications and when to take them. Check marks below indicate your daily home schedule. Keep this list as a reference.      Medications           Morning Afternoon Evening Bedtime As Needed    albuterol 108 (90 Base) MCG/ACT Inhaler   Commonly known as:  VENTOLIN HFA   Inhale 2 puffs into the lungs every 4 hours as needed for shortness of breath / dyspnea or wheezing Every 4-6 hours as needed                                buPROPion 300 MG 24 hr tablet   Commonly known as:  WELLBUTRIN XL   Take 1 tablet (300 mg) by mouth daily   Last time this was given:  300 mg on 4/30/2018  8:34 AM                                fluticasone 50 MCG/ACT spray   Commonly known as:  FLONASE   Spray 2 sprays into both nostrils daily as needed for rhinitis or allergies                                gabapentin 300 MG capsule   Commonly known as:  NEURONTIN   Take 1 capsule (300 mg) by mouth 3 times daily   Last time this was given:  300 mg on 4/30/2018  8:34 AM                                glyBURIDE 5 MG tablet    Commonly known as:  DIABETA /MICRONASE   Take 1 tablet (5 mg) by mouth daily (with dinner)   Last time this was given:  5 mg on 4/29/2018  4:55 PM                                hydrOXYzine 25 MG tablet   Commonly known as:  ATARAX   Take 1-2 tablets (25-50 mg) by mouth every 4 hours as needed for anxiety   Last time this was given:  50 mg on 4/26/2018  2:26 PM                                lidocaine visc 2% 2.5mL/5mL & maalox/mylanta w/ simeth 2.5mL/5mL & diphenhydrAMINE 5mg/5mL Susp suspension   Commonly known as:  MAGIC Mouthwash [facility]   Swish and swallow 10 mLs in mouth every 6 hours as needed for mouth sores                                loratadine 10 MG tablet   Commonly known as:  CLARITIN   Take 1 tablet (10 mg) by mouth daily   Last time this was given:  10 mg on 4/30/2018  8:34 AM                                melatonin 3 MG Caps   Take 9 mg by mouth At Bedtime (3 tablets)   Last time this was given:  1 tablet on 4/26/2018  9:32 PM                                nicotine 21 MG/24HR 24 hr patch   Commonly known as:  NICODERM CQ   Place 1 patch onto the skin every 24 hours                                pantoprazole 40 MG EC tablet   Commonly known as:  PROTONIX   Take 1 tablet (40 mg) by mouth daily   Last time this was given:  40 mg on 4/30/2018  6:06 AM                                promethazine 25 MG tablet   Commonly known as:  PHENERGAN   Take 1 tablet (25 mg) by mouth every 6 hours as needed for nausea   Last time this was given:  25 mg on 4/26/2018  2:33 PM                                QUEtiapine 400 MG tablet   Commonly known as:  SEROquel   Take 2 tablets (800 mg) by mouth At Bedtime   Last time this was given:  800 mg on 4/29/2018  9:52 PM                                SUMAtriptan 25 MG tablet   Commonly known as:  IMITREX   Take 2 tablets (50 mg) by mouth at onset of headache Max two doses in 24 hour period   Last time this was given:  50 mg on 4/30/2018  8:44 AM
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
## 2018-04-24 NOTE — IP AVS SNAPSHOT
` `     [facility]: [phone]            Medication Administration Report for Ar Irby III as of 04/30/18 0926   Legend:    Given Hold Not Given Due Canceled Entry Other Actions    Time Time (Time) Time  Time-Action       Inactive    Active    Linked        Medications 04/24/18 04/25/18 04/26/18 04/27/18 04/28/18 04/29/18 04/30/18    acetaminophen (TYLENOL) tablet 650 mg  Dose: 650 mg  Freq: EVERY 4 HOURS PRN Route: PO  PRN Reason: mild pain  Start: 04/25/18 1143   Admin Instructions: Do not use if the patient has significant liver disease. MAX acetaminophen = 4000 mg/24 hrs.  MAX acetaminophen <3000 mg/24 hrs for patients > or = 65 years old.  Maximum acetaminophen dose from all sources = 75 mg/kg/day not to exceed 4 grams/day.    Admin. Amount: 2 tablet (2 × 325 mg tablet)  Last Admin: 04/26/18 0749  [id number]      1436 (650 mg)-Given       2102 (650 mg)-Given [C]        0749 (650 mg)-Given [C]               albuterol (PROAIR HFA/PROVENTIL HFA/VENTOLIN HFA) Inhaler 2 puff  Dose: 2 puff  Freq: EVERY 4 HOURS PRN Route: IN  PRN Reasons: wheezing,shortness of breath / dyspnea  Start: 04/25/18 1435   Admin. Amount: 2 puff  Dispense Loc: Pending sale to [facility]               alum & mag hydroxide-simethicone (MYLANTA ES/MAALOX  ES) suspension 30 mL  Dose: 30 mL  Freq: EVERY 6 HOURS PRN Route: PO  PRN Reasons: indigestion,heartburn  Start: 04/25/18 1448   Admin Instructions: Shake well.    Admin. Amount: 30 mL  Last Admin: 04/26/18 1652  [facility]  Volume: 30 mL      1450 (30 mL)-Given        0453 (30 mL)-Given       0750 (30 mL)-Given       1652 (30 mL)-Given               benzocaine-menthol (CEPACOL) 15-3.6 MG lozenge 1 lozenge  Dose: 1 lozenge  Freq: EVERY 1 HOUR PRN Route: BU  PRN Reason: sore throat  Start: 04/28/18 1354   Admin. Amount: 1 lozenge  Dispense Loc: Pending sale to [facility]               buPROPion (WELLBUTRIN XL) 24 hr tablet 300 mg  Dose: 300  mg  Freq: DAILY Route: PO  Start: 04/28/18 0900   Admin Instructions: DO NOT CRUSH.    Admin. Amount: 2 tablet (2 × 150 mg tablet)  Last Admin: 04/30/18 0834  Dispense Loc: [facility] [id number]         0827 (300 mg)-Given        0855 (300 mg)-Given        0834 (300 mg)-Given           gabapentin (NEURONTIN) capsule 300 mg  Dose: 300 mg  Freq: 3 TIMES DAILY Route: PO  Start: 04/27/18 1400   Admin. Amount: 1 capsule (1 × 300 mg capsule)  Last Admin: 04/30/18 0834  Dispense Loc: [facility] [id number]        1311 (300 mg)-Given       2143 (300 mg)-Given        0828 (300 mg)-Given       1445 (300 mg)-Given       2117 (300 mg)-Given        0855 (300 mg)-Given       1426 (300 mg)-Given       2152 (300 mg)-Given        0834 (300 mg)-Given       [ ] 1400       [ ] 2100           glucose gel 15-30 g  Dose: 15-30 g  Freq: EVERY 15 MIN PRN Route: PO  PRN Reason: low blood sugar  Start: 04/24/18 1857   Admin Instructions: Give 15 g for BG 51 to 69 mg/dL IF patient is conscious and able to swallow. Give 30 g for BG less than or equal to 50 mg/dL IF patient is conscious and able to swallow. Do NOT give glucose gel via enteral tube.  IF patient has enteral tube: give apple juice 120 mL (4 oz or 15 g of CHO) via enteral tube for BG 51 to 69 mg/dL.  Give apple juice 240 mL (8 oz or 30 g of CHO) via enteral tube for BG less than or equal to 50 mg/dL.    ~Oral gel is preferable for conscious and able to swallow patient.   ~IF gel unavailable or patient refuses may provide apple juice 120 mL (4 oz or 15 g of CHO). Document juice on I and O flowsheet.    Admin. Amount: 15-30 g  [facility]pense [facility]: [facility]  Volume: 93.75 mL              Or  dextrose 50 % injection 25-50 mL  Dose: 25-50 mL  Freq: EVERY 15 MIN PRN Route: IV  PRN Reason: low blood sugar  Start: 04/24/18 1857   Admin Instructions: Use if have IV access, BG less than 70 mg/dL and meet dose criteria below:  Dose if conscious and alert (or disorientated) and NPO = 25 mL  Dose if  unconscious / not alert = 50 mL  Vesicant. For ordered doses up to 25 g, give IV Push undiluted. Give each 5g over 1 minute.    Admin. Amount: 25-50 mL  Dispense Loc: [facility]  Infused Over: 1-5 Minutes  Volume: 50 mL              Or  glucagon injection 1 mg  Dose: 1 mg  Freq: EVERY 15 MIN PRN Route: SC  PRN Reason: low blood sugar  PRN Comment: May repeat x 1 only  Start: 04/24/18 1857   Admin Instructions: May give SQ or IM. ONLY use glucagon IF patient has NO IV access AND is UNABLE to swallow AND blood glucose is LESS than or EQUAL to 50 mg/dL.  Give IV Push over 1 minute. Reconstitute with 1mL sterile water.    Admin. Amount: 1 mg  Dispense Loc: [facility]               glyBURIDE (DIABETA /MICRONASE) tablet 5 mg  Dose: 5 mg  Freq: DAILY WITH SUPPER Route: PO  Start: 04/24/18 1852   Admin. Amount: 1 tablet (1 × 5 mg tablet)  Last Admin: 04/29/18 1655  Dispense Loc: [facility] [id number]     1921 (5 mg)-Given        1713 (5 mg)-Given        1728 (5 mg)-Given        1707 (5 mg)-Given        1701 (5 mg)-Given        1655 (5 mg)-Given        [ ] 1700           hydrOXYzine (ATARAX) tablet 25-50 mg  Dose: 25-50 mg  Freq: EVERY 4 HOURS PRN Route: PO  PRN Reason: anxiety  Start: 04/25/18 1143   Admin. Amount: 1-2 tablet (1-2 × 25 mg tablet)  Last Admin: 04/26/18 1426  Dispense Loc: [facility] [id number]      1436 (50 mg)-Given        1426 (50 mg)-Given               insulin aspart (NovoLOG) inj (RAPID ACTING)  Dose: 1-7 Units  Freq: 3 TIMES DAILY BEFORE MEALS Route: SC  Start: 04/25/18 0730   Admin Instructions: Correction Scale - MEDIUM INSULIN RESISTANCE DOSING     Do Not give Correction Insulin if Pre-Meal BG less than 140.   For Pre-Meal  - 189 give 1 unit.   For Pre-Meal  - 239 give 2 units.   For Pre-Meal  - 289 give 3 units.   For Pre-Meal  - 339 give 4 units.   For Pre-Meal - 399 give 5 units.   For Pre-Meal -449 give 6 units  For Pre-Meal BG greater than or  equal to 450 give 7 units.   To be given with prandial insulin, and based on pre-meal blood glucose.    Notify provider if glucose greater than or equal to 350 mg/dL after administration of correction dose.  If given at mealtime, must be administered 5 min before meal or immediately after.    Admin. Amount: 1-7 Units  Last Admin: 04/28/18 1701  Dispense Loc: Contact Rx for Dose  Volume: 3 mL      0740 (3 Units)-Given [C]       1305-Hold [C]       1735 (4 Units)-Given        0845 (4 Units)-Given       1228 (4 Units)-Given       (1728)-Not Given        0833 (3 Units)-Given       (1233)-Not Given       1708 (5 Units)-Given        0825 (4 Units)-Given       1147 (4 Units)-Given       1701 (5 Units)-Given [C]        (0856)-Not Given       (1144)-Not Given       (1643)-Not Given [C]        (0833)-Not Given       [ ] 1130       [ ] 1630           loratadine (CLARITIN) tablet 10 mg  Dose: 10 mg  Freq: DAILY Route: PO  Start: 04/25/18 1500   Admin. Amount: 1 tablet (1 × 10 mg tablet)  Last Admin: 04/30/18 0834  Dispense Loc: [address]      1524 (10 mg)-Given        0844 (10 mg)-Given        0808 (10 mg)-Given        0828 (10 mg)-Given        0854 (10 mg)-Given        0834 (10 mg)-Given           magic mouthwash suspension (diphenhydrAMINE, lidocaine, aluminum-magnesium & simethicone)  Dose: 10 mL  Freq: EVERY 6 HOURS PRN Route: SWISH & SWAL  PRN Reason: mouth sores  Start: 04/25/18 1449   Admin Instructions: Shake well.    Admin. Amount: 10 mL  Dispense Loc: [facility]  Volume: 120 mL               magnesium hydroxide (MILK OF MAGNESIA) suspension 30 mL  Dose: 30 mL  Freq: AT BEDTIME PRN Route: PO  PRN Reason: constipation  Start: 04/25/18 1143   Admin Instructions: Shake well.    Admin. Amount: 30 mL  Dispense Loc: [facility]  Volume: 30 mL               melatonin tablet 9 mg  Dose: 9 mg  Freq: AT BEDTIME Route: PO  Start: 04/27/18 2100   Admin Instructions: Clarified dose as 9 mg via  Xena(4/27/18)    Admin. Amount: 3 tablet (3 × 3 mg tablet)  Last Admin: 04/29/18 2152  Dispense Loc: [facility] [id number]        2143 (9 mg)-Given        2117 (9 mg)-Given        2152 (9 mg)-Given        [ ] 2100           nicotine polacrilex (NICORETTE) gum 2-4 mg  Dose: 2-4 mg  Freq: EVERY 1 HOUR PRN Route: BU  PRN Reason: other  PRN Comment: nicotine withdrawal symptoms  Start: 04/25/18 1143   Admin Instructions: Chew until tingling, then place between cheek and gum.    Repeat.    Do not swallow.    Not to exceed 48 mg in a 24 hour time period.    Admin. Amount: 1-2 each (1-2 × 2 mg each)  Dispense Loc: [facility] [id number]               OLANZapine (zyPREXA) tablet 10 mg  Dose: 10 mg  Freq: EVERY 2 HOURS PRN Route: PO  PRN Reason: agitation  PRN Comment: associated with psychosis or peace  Start: 04/25/18 1143   Admin Instructions: Consider lower dose if sedation or hypotension.  Combined IM and PO doses may significantly increase the risk of orthostatic hypotension at 30 mg per day or higher.    Admin. Amount: 1 tablet (1 × 10 mg tablet)  Dispense Loc: [facility] [id number]              Or  OLANZapine (zyPREXA) injection 10 mg  Dose: 10 mg  Freq: EVERY 2 HOURS PRN Route: IM  PRN Reason: agitation  PRN Comment: associated with psychosis or peace  Start: 04/25/18 1143   Admin Instructions: Not to exceed 30 mg in 24 hours.  Consider lower dose if sedation or hypotension.  Dissolve the contents of the 10 mg vial using 2.1 mL of Sterile Water for Injection to provide a solution containing 5 mg/mL of olanzapine. Withdraw the ordered dose from vial. Use immediately (within 1 hour) after reconstitution. Discard any unused portion.    Admin. Amount: 10 mg  Dispense Loc: [facility] [id number]               pantoprazole (PROTONIX) EC tablet 40 mg  Dose: 40 mg  Freq: 2 TIMES DAILY BEFORE MEALS Route: PO  Start: 04/26/18 1715   Admin. Amount: 1 tablet (1 × 40 mg tablet)  Last Admin: 04/30/18 0606  Dispense Loc: [facility] [id number]       1726 (40 mg)-Given        0611  (40 mg)-Given              1707 (40 mg)-Given        0603 (40 mg)-Given       0828 (40 mg)-Given       1630 (40 mg)-Given        0602 (40 mg)-Given              1655 (40 mg)-Given        0606 (40 mg)-Given              [ ] 1630           promethazine (PHENERGAN) tablet 25 mg  Dose: 25 mg  Freq: EVERY 6 HOURS PRN Route: PO  PRN Reason: nausea  Start: 04/25/18 1436   Admin. Amount: 1 tablet (1 × 25 mg tablet)  Last Admin: 04/26/18 1433  Dispense Loc: [facility] [id number]       1433 (25 mg)-Given               QUEtiapine (SEROquel) tablet 800 mg  Dose: 800 mg  Freq: AT BEDTIME Route: PO  Start: 04/24/18 2301   Admin. Amount: 4 tablet (4 × 200 mg tablet)  Last Admin: 04/29/18 2152  Dispense Loc: [facility] [id number]     2328 (800 mg)-Given        2205 (800 mg)-Given        2132 (800 mg)-Given        2143 (800 mg)-Given        2117 (800 mg)-Given        2152 (800 mg)-Given        [ ] 2200           SUMAtriptan (IMITREX) tablet 50 mg  Dose: 50 mg  Freq: AT ONSET OF HEADACHE Route: PO  PRN Reason: migraine  Start: 04/25/18 1437   Admin Instructions: May repeat dose in 2 hours if no relief.  Do not exceed 2 doses in 24 hours.    Admin. Amount: 2 tablet (2 × 25 mg tablet)  Last Admin: 04/30/18 0844  Dispense Loc: [facility] [id number]       1429 (50 mg)-Given       1651 (50 mg)-Given        1513 (50 mg)-Given        0827 (50 mg)-Given       2117 (50 mg)-Given         0844 (50 mg)-Given           traZODone (DESYREL) tablet 50 mg  Dose: 50 mg  Freq: AT BEDTIME PRN Route: PO  PRN Reason: sleep  Start: 04/25/18 1143   Admin Instructions: May repeat x 1    Admin. Amount: 1 tablet (1 × 50 mg tablet)  Dispense Loc: [facility] [id number]              Completed Medications  Medications 04/24/18 04/25/18 04/26/18 04/27/18 04/28/18 04/29/18 04/30/18         Dose: 150 mg  Freq: ONCE Route: PO  Start: 04/27/18 1130   End: 04/27/18 1233   Admin Instructions: DO NOT CRUSH.    Admin. Amount: 1 tablet (1 × 150 mg tablet)  Last Admin: 04/27/18 1233  Dispense Loc: [facility]  [id number]  Administrations Remainin        1233 (150 mg)-Given             Discontinued Medications  Medications 18         Dose: 150 mg  Freq: DAILY Route: PO  Start: 18 1500   End: 18 1116   Admin Instructions: DO NOT CRUSH.    Admin. Amount: 1 tablet (1 × 150 mg tablet)  Last Admin: 18 0808  Dispense Loc: [facility] [id number]      1524 (150 mg)-Given        0845 (150 mg)-Given        0808 (150 mg)-Given       1116-Med Discontinued            Dose: 1 spray  Freq: DAILY Route: BOTH NOSTRIL  Start: 18 1500   End: 18 0941   Admin. Amount: 1 spray  Dispense Loc: [facility]      (1522)-Not Given        (0849)-Not Given        (0809)-Not Given        (0829)-Not Given        (0856)-Not Given [C]       0941-Med Discontinued          Dose: 1 tablet  Freq: AT BEDTIME Route: PO  Start: 18   End: 18 122   Admin. Amount: 1 tablet  Last Admin: 18  Dispense Loc: [facility] [id number]      6 (1 tablet)-Given         (1 tablet)-Given        1221-Med Discontinued

## 2018-04-24 NOTE — ED TRIAGE NOTES
"Pt reports , \"I put a knife to my throat, I was going to slit my throat, too much pain and anxiety, My dad brought me here because he said I need help. This is over welming, I can't handle it.\"  Unresolved grief issues. Pt requesting , \"a pill for my anxiety.\" Pt is tearful and anxious.  "

## 2018-04-25 PROBLEM — R45.851 SUICIDAL INTENT: Status: ACTIVE | Noted: 2018-04-25

## 2018-04-25 LAB
ANION GAP SERPL CALCULATED.3IONS-SCNC: 11 MMOL/L (ref 3–14)
BUN SERPL-MCNC: 11 MG/DL (ref 7–30)
CALCIUM SERPL-MCNC: 8.2 MG/DL (ref 8.5–10.1)
CHLORIDE SERPL-SCNC: 105 MMOL/L (ref 94–109)
CO2 SERPL-SCNC: 23 MMOL/L (ref 20–32)
CREAT SERPL-MCNC: 0.71 MG/DL (ref 0.66–1.25)
GFR SERPL CREATININE-BSD FRML MDRD: >90 ML/MIN/1.7M2
GLUCOSE BLDC GLUCOMTR-MCNC: 163 MG/DL (ref 70–99)
GLUCOSE BLDC GLUCOMTR-MCNC: 255 MG/DL (ref 70–99)
GLUCOSE BLDC GLUCOMTR-MCNC: 311 MG/DL (ref 70–99)
GLUCOSE BLDC GLUCOMTR-MCNC: 349 MG/DL (ref 70–99)
GLUCOSE SERPL-MCNC: 262 MG/DL (ref 70–99)
KETONES BLD-SCNC: 0.4 MMOL/L (ref 0–0.6)
POTASSIUM SERPL-SCNC: 3.6 MMOL/L (ref 3.4–5.3)
SODIUM SERPL-SCNC: 139 MMOL/L (ref 133–144)

## 2018-04-25 PROCEDURE — 00000146 ZZHCL STATISTIC GLUCOSE BY METER IP

## 2018-04-25 PROCEDURE — 99223 1ST HOSP IP/OBS HIGH 75: CPT | Performed by: NURSE PRACTITIONER

## 2018-04-25 PROCEDURE — 80048 BASIC METABOLIC PNL TOTAL CA: CPT | Performed by: PHYSICIAN ASSISTANT

## 2018-04-25 PROCEDURE — 25000132 ZZH RX MED GY IP 250 OP 250 PS 637: Performed by: NURSE PRACTITIONER

## 2018-04-25 PROCEDURE — 82010 KETONE BODYS QUAN: CPT | Performed by: PHYSICIAN ASSISTANT

## 2018-04-25 PROCEDURE — 25000131 ZZH RX MED GY IP 250 OP 636 PS 637: Performed by: PHYSICIAN ASSISTANT

## 2018-04-25 PROCEDURE — 36415 COLL VENOUS BLD VENIPUNCTURE: CPT | Performed by: PHYSICIAN ASSISTANT

## 2018-04-25 PROCEDURE — 25000132 ZZH RX MED GY IP 250 OP 250 PS 637: Performed by: FAMILY MEDICINE

## 2018-04-25 PROCEDURE — 25000132 ZZH RX MED GY IP 250 OP 250 PS 637: Performed by: PHYSICIAN ASSISTANT

## 2018-04-25 PROCEDURE — 12400000 ZZH R&B MH

## 2018-04-25 RX ORDER — TRAZODONE HYDROCHLORIDE 50 MG/1
50 TABLET, FILM COATED ORAL
Status: DISCONTINUED | OUTPATIENT
Start: 2018-04-25 | End: 2018-04-30 | Stop reason: HOSPADM

## 2018-04-25 RX ORDER — FLUTICASONE PROPIONATE 50 MCG
1 SPRAY, SUSPENSION (ML) NASAL DAILY
Status: DISCONTINUED | OUTPATIENT
Start: 2018-04-25 | End: 2018-04-29

## 2018-04-25 RX ORDER — SUMATRIPTAN 25 MG/1
50 TABLET, FILM COATED ORAL
Status: DISCONTINUED | OUTPATIENT
Start: 2018-04-25 | End: 2018-04-30 | Stop reason: HOSPADM

## 2018-04-25 RX ORDER — ALBUTEROL SULFATE 90 UG/1
2 AEROSOL, METERED RESPIRATORY (INHALATION) EVERY 4 HOURS PRN
Status: DISCONTINUED | OUTPATIENT
Start: 2018-04-25 | End: 2018-04-30 | Stop reason: HOSPADM

## 2018-04-25 RX ORDER — ALUMINA, MAGNESIA, AND SIMETHICONE 2400; 2400; 240 MG/30ML; MG/30ML; MG/30ML
30 SUSPENSION ORAL EVERY 6 HOURS PRN
Status: DISCONTINUED | OUTPATIENT
Start: 2018-04-25 | End: 2018-04-30 | Stop reason: HOSPADM

## 2018-04-25 RX ORDER — HYDROXYZINE HYDROCHLORIDE 25 MG/1
25-50 TABLET, FILM COATED ORAL EVERY 4 HOURS PRN
Status: DISCONTINUED | OUTPATIENT
Start: 2018-04-25 | End: 2018-04-30 | Stop reason: HOSPADM

## 2018-04-25 RX ORDER — ACETAMINOPHEN 325 MG/1
650 TABLET ORAL EVERY 4 HOURS PRN
Status: DISCONTINUED | OUTPATIENT
Start: 2018-04-25 | End: 2018-04-30 | Stop reason: HOSPADM

## 2018-04-25 RX ORDER — LORATADINE 10 MG/1
10 TABLET ORAL DAILY
Status: DISCONTINUED | OUTPATIENT
Start: 2018-04-25 | End: 2018-04-30 | Stop reason: HOSPADM

## 2018-04-25 RX ORDER — OLANZAPINE 10 MG/2ML
10 INJECTION, POWDER, FOR SOLUTION INTRAMUSCULAR
Status: DISCONTINUED | OUTPATIENT
Start: 2018-04-25 | End: 2018-04-30 | Stop reason: HOSPADM

## 2018-04-25 RX ORDER — PROMETHAZINE HYDROCHLORIDE 25 MG/1
25 TABLET ORAL EVERY 6 HOURS PRN
Status: DISCONTINUED | OUTPATIENT
Start: 2018-04-25 | End: 2018-04-30 | Stop reason: HOSPADM

## 2018-04-25 RX ORDER — BUPROPION HYDROCHLORIDE 150 MG/1
150 TABLET ORAL DAILY
Status: DISCONTINUED | OUTPATIENT
Start: 2018-04-25 | End: 2018-04-27

## 2018-04-25 RX ORDER — OLANZAPINE 10 MG/1
10 TABLET ORAL
Status: DISCONTINUED | OUTPATIENT
Start: 2018-04-25 | End: 2018-04-30 | Stop reason: HOSPADM

## 2018-04-25 RX ORDER — PANTOPRAZOLE SODIUM 40 MG/1
40 TABLET, DELAYED RELEASE ORAL
Status: DISCONTINUED | OUTPATIENT
Start: 2018-04-26 | End: 2018-04-26

## 2018-04-25 RX ADMIN — QUETIAPINE FUMARATE 800 MG: 200 TABLET, FILM COATED ORAL at 22:05

## 2018-04-25 RX ADMIN — GLYBURIDE 5 MG: 5 TABLET ORAL at 17:13

## 2018-04-25 RX ADMIN — ACETAMINOPHEN 650 MG: 325 TABLET ORAL at 14:36

## 2018-04-25 RX ADMIN — ALUMINUM HYDROXIDE, MAGNESIUM HYDROXIDE, AND DIMETHICONE 30 ML: 400; 400; 40 SUSPENSION ORAL at 14:50

## 2018-04-25 RX ADMIN — LORATADINE 10 MG: 10 TABLET ORAL at 15:24

## 2018-04-25 RX ADMIN — INSULIN ASPART 3 UNITS: 100 INJECTION, SOLUTION INTRAVENOUS; SUBCUTANEOUS at 07:40

## 2018-04-25 RX ADMIN — INSULIN ASPART 4 UNITS: 100 INJECTION, SOLUTION INTRAVENOUS; SUBCUTANEOUS at 17:35

## 2018-04-25 RX ADMIN — HYDROXYZINE HYDROCHLORIDE 50 MG: 25 TABLET ORAL at 14:36

## 2018-04-25 RX ADMIN — ACETAMINOPHEN 650 MG: 325 TABLET ORAL at 21:02

## 2018-04-25 RX ADMIN — Medication 1 TABLET: at 22:06

## 2018-04-25 RX ADMIN — BUPROPION HYDROCHLORIDE 150 MG: 150 TABLET, FILM COATED, EXTENDED RELEASE ORAL at 15:24

## 2018-04-25 ASSESSMENT — ACTIVITIES OF DAILY LIVING (ADL)
BATHING: 0 - INDEPENDENT
TRANSFERRING: 0 - INDEPENDENT
ORAL_HYGIENE: INDEPENDENT
DRESS: SCRUBS (BEHAVIORAL HEALTH);INDEPENDENT
DRESS: 0-->INDEPENDENT
TOILETING: 0-->INDEPENDENT
SWALLOWING: 0 - SWALLOWS FOODS/LIQUIDS WITHOUT DIFFICULTY
COGNITION: 1 - ATTENTION OR MEMORY DEFICITS
AMBULATION: 0-->INDEPENDENT
GROOMING: INDEPENDENT
CURRENT_FUNCTIONAL_LEVEL_COMMENT: INDEPENDENT
FALL_HISTORY_WITHIN_LAST_SIX_MONTHS: YES
EATING: 0 - INDEPENDENT
TRANSFERRING: 0-->INDEPENDENT
NUMBER_OF_TIMES_PATIENT_HAS_FALLEN_WITHIN_LAST_SIX_MONTHS: 2
DRESS: 0 - INDEPENDENT
RETIRED_EATING: 0-->INDEPENDENT
AMBULATION: 0 - INDEPENDENT
TOILETING: 0 - INDEPENDENT
COMMUNICATION: 0 - UNDERSTANDS/COMMUNICATES WITHOUT DIFFICULTY
RETIRED_COMMUNICATION: 0-->UNDERSTANDS/COMMUNICATES WITHOUT DIFFICULTY
SWALLOWING: 0-->SWALLOWS FOODS/LIQUIDS WITHOUT DIFFICULTY
BATHING: 0-->INDEPENDENT

## 2018-04-25 NOTE — PROGRESS NOTES
04/25/18 1414   Patient Belongings   Did you bring any home meds/supplements to the hospital?  No   Patient Belongings bracelet;cell phone/electronics;clothing;earings;keys;jewelry;shoes;necklace;money (see comment);other (see comments)   Disposition of Belongings Other (see comment);Kept with patient  (rubber bracelet and 1 post earring left with patient)   Belongings Search Yes   Clothing Search Yes   Second Staff Chacha MENSAH - RN   General Info Comment black, white, and red Arturo tennis shoes, black t-shirt, red, black, and yellow snap up jersey, 2 prs white socks, knit cap, brown  belt, phone , headphones, sunglasses with broken ear piece, glass bottle of cologne, lighter, $.98 change (3 quarters, 2 dimes, and 3 pennies), 2 cross necklaces,; 1 key on [employer], black & white HTC cricket cell phone with cracked screen and broken back piece.    List items sent to safe: *1 key on [employer], black & white HTC cricket cell phone with cracked screen and broken back piece  All other belongings put in assigned cubby in belongings room.     I have reviewed my belongings list on admission and verify that it is correct.     Patient signature_______________________________    Second staff witness (if patient unable to sign) ______________________________       I have received all my belongings at discharge.    Patient signature________________________________    Rula LAU  4/25/2018  2:21 PM

## 2018-04-25 NOTE — ED NOTES
Pt is awake and cooperative, pt agreeable to admit, report called chandni Burnham RN. Pt transported to -[facility].

## 2018-04-25 NOTE — ED NOTES
Pt resting quietly, no complaints at this time, calm and cooperative.  Call light in reach.  Parents sitting at bedside.   1:1 present outside room.

## 2018-04-25 NOTE — PLAN OF CARE
Face to face end of shift report received from ESTER Rose RN. Rounding completed. Patient observed, up in [facility]..     Babatunde Garcia  4/25/2018  4:11 PM
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
## 2018-04-25 NOTE — PROGRESS NOTES
I did reach Yuli at the [place] where the patient resides.  He moved there recently from [facility].  He does not have a county worker.  He is part of the board and lodge with special services.  Call to Vianey on the mental health unit to hand off.  Spoke with Andry at central intake this morning to update and place in the transfer system.
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
## 2018-04-25 NOTE — ED NOTES
TC from Lady, nurse at [facility].  Provider will not accept patient, cannot accommodate patient due to noncompliance with Diabetes.  Provider Nneka Rowley notified.  Pt calm and cooperative at this time.   present 1:1 monitoring.

## 2018-04-25 NOTE — ED NOTES
This writer spoke with Lady from Intake at [facility] whom requested a provider's note from today that covered why pt brought in to hospital and treatment including medical that was done today and faxed to [phone]. Nneka FOURNIER notified of the above with plan to make this note available.

## 2018-04-26 LAB
GLUCOSE BLDC GLUCOMTR-MCNC: 290 MG/DL (ref 70–99)
GLUCOSE BLDC GLUCOMTR-MCNC: 300 MG/DL (ref 70–99)
GLUCOSE BLDC GLUCOMTR-MCNC: 330 MG/DL (ref 70–99)

## 2018-04-26 PROCEDURE — 00000146 ZZHCL STATISTIC GLUCOSE BY METER IP

## 2018-04-26 PROCEDURE — 25000132 ZZH RX MED GY IP 250 OP 250 PS 637: Performed by: PHYSICIAN ASSISTANT

## 2018-04-26 PROCEDURE — 12400000 ZZH R&B MH

## 2018-04-26 PROCEDURE — 25000132 ZZH RX MED GY IP 250 OP 250 PS 637: Performed by: NURSE PRACTITIONER

## 2018-04-26 PROCEDURE — 25000132 ZZH RX MED GY IP 250 OP 250 PS 637: Performed by: FAMILY MEDICINE

## 2018-04-26 RX ORDER — PANTOPRAZOLE SODIUM 40 MG/1
40 TABLET, DELAYED RELEASE ORAL
Status: DISCONTINUED | OUTPATIENT
Start: 2018-04-26 | End: 2018-04-30 | Stop reason: HOSPADM

## 2018-04-26 RX ADMIN — ALUMINUM HYDROXIDE, MAGNESIUM HYDROXIDE, AND DIMETHICONE 30 ML: 400; 400; 40 SUSPENSION ORAL at 04:53

## 2018-04-26 RX ADMIN — Medication 1 TABLET: at 21:32

## 2018-04-26 RX ADMIN — INSULIN ASPART 4 UNITS: 100 INJECTION, SOLUTION INTRAVENOUS; SUBCUTANEOUS at 08:45

## 2018-04-26 RX ADMIN — ALUMINUM HYDROXIDE, MAGNESIUM HYDROXIDE, AND DIMETHICONE 30 ML: 400; 400; 40 SUSPENSION ORAL at 07:50

## 2018-04-26 RX ADMIN — ACETAMINOPHEN 650 MG: 325 TABLET ORAL at 07:49

## 2018-04-26 RX ADMIN — ALUMINUM HYDROXIDE, MAGNESIUM HYDROXIDE, AND DIMETHICONE 30 ML: 400; 400; 40 SUSPENSION ORAL at 16:52

## 2018-04-26 RX ADMIN — PANTOPRAZOLE SODIUM 40 MG: 40 TABLET, DELAYED RELEASE ORAL at 17:26

## 2018-04-26 RX ADMIN — PROMETHAZINE HYDROCHLORIDE 25 MG: 25 TABLET ORAL at 14:33

## 2018-04-26 RX ADMIN — SUMATRIPTAN 50 MG: 25 TABLET, FILM COATED ORAL at 16:51

## 2018-04-26 RX ADMIN — HYDROXYZINE HYDROCHLORIDE 50 MG: 25 TABLET ORAL at 14:26

## 2018-04-26 RX ADMIN — GLYBURIDE 5 MG: 5 TABLET ORAL at 17:28

## 2018-04-26 RX ADMIN — SUMATRIPTAN 50 MG: 25 TABLET, FILM COATED ORAL at 14:29

## 2018-04-26 RX ADMIN — INSULIN ASPART 4 UNITS: 100 INJECTION, SOLUTION INTRAVENOUS; SUBCUTANEOUS at 12:28

## 2018-04-26 RX ADMIN — QUETIAPINE FUMARATE 800 MG: 200 TABLET, FILM COATED ORAL at 21:32

## 2018-04-26 RX ADMIN — PANTOPRAZOLE SODIUM 40 MG: 40 TABLET, DELAYED RELEASE ORAL at 06:33

## 2018-04-26 RX ADMIN — BUPROPION HYDROCHLORIDE 150 MG: 150 TABLET, FILM COATED, EXTENDED RELEASE ORAL at 08:45

## 2018-04-26 RX ADMIN — LORATADINE 10 MG: 10 TABLET ORAL at 08:44

## 2018-04-26 ASSESSMENT — ACTIVITIES OF DAILY LIVING (ADL)
ORAL_HYGIENE: INDEPENDENT
ORAL_HYGIENE: INDEPENDENT
LAUNDRY: UNABLE TO COMPLETE
DRESS: SCRUBS (BEHAVIORAL HEALTH);INDEPENDENT
GROOMING: INDEPENDENT
DRESS: INDEPENDENT;SCRUBS (BEHAVIORAL HEALTH)
GROOMING: INDEPENDENT

## 2018-04-26 NOTE — PLAN OF CARE
Face to face end of shift report received from Kimberlee GORE RN. Rounding completed. Patient observed in lounge sitting watching tv.     Naty Motta  4/26/2018  7:55 AM
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
## 2018-04-26 NOTE — PROGRESS NOTES
"CLINICAL NUTRITION SERVICES  -  ASSESSMENT NOTE    Ar Irby III : Admission Nutrition Risk Screen - new/uncontrolled diabetes    37 yom admitted for suicidal intent. Pt has a hx of diabetes with Hgb A1C at 8.3 (4/17/18). Pt has previously been on Metformin and stopped taking it about a year ago. Glyburide and Novolog ordered. Referral to diabetes education was made, but it looks like it has been cancelled?    Currently on a regular diet with double portions. Pt has been eating 100% of most meals. Lunch today pt ate 50%.    Height: 5' 10\"  Weight: 231 lbs 0 oz  Body mass index is 33.15 kg/(m^2).  Weight Status:  Obesity Grade I BMI 30-34.9  IBW: 128-173lb  Weight History:   235lb - 4/17/18 229lb - 4/3/18  242lb - 3/21/18  231lb - 2/16/18  226lb - 8/15/17    Estimated Energy Needs: 1950- 2100 kcals ([place] [address] Jeor with stress factor 1-1.05)  Estimated Protein Needs: 60-80 grams protein (0.8-1 g pro/Kg- calculated using IBW 173lbs)      NUTRITION DIAGNOSIS:  Altered nutrition-related lab value related to endocrine dysfunction as evidenced by Hgb A1C 8.3.    NUTRITION RECOMMENDATIONS  -- Change diet to Consistent Carbohydrate.  -- Referral to diabetes education if it has been cancelled.    MONITORING AND EVALUATION:  RD will monitor intake, weight, labs    "

## 2018-04-26 NOTE — PLAN OF CARE
"Problem: Patient Care Overview  Goal: Individualization & Mutuality  Pt will sleep 6-8 hours.  Pt will eat at least 50% of meals.  Pt will attend 50% of groups.     Outcome: No Change  Pt up and about on the unit most of the shift. Tends to keep mostly to self. Did attend groups this evening. Difficult to assess, as pt mumbles and provides minimal responses. Reported increased anxiety and depression, but denied SI at time of assessment. Did c/o \"headache\" pain, rated 5/10. PRN APAP 650 mg PO given at 2100 with little effect. Accucheck before supper meal 311 mg/dl. Sliding scale coverage (Novolog) 4 units given SQ.     Problem: Suicide Risk (Adult)  Goal: Strength-Based Wellness/Recovery  Patient will demonstrate the desired outcomes by discharge/transition of care.  Pt will report no suicidal ideation.  Pt will report reduction in depression and anxiety to manageable level.     Outcome: No Change  Continued reports of anxiety and depression, but denied SI on assessment.  Goal: Physical Safety  Patient will demonstrate the desired outcomes by discharge/transition of care.   Outcome: No Change  Remained free from self-harm and/or injury this shift.      "

## 2018-04-26 NOTE — PLAN OF CARE
"Problem: Patient Care Overview  Goal: Individualization & Mutuality  Pt will sleep 6-8 hours.  Pt will eat at least 50% of meals.  Pt will attend 50% of groups.     Pt denies HI SI anxiety and hallucinations.  C/O headache gave tylenol 650 mg at 0749.  C/O indigestion gave maalox at 0750.     Pt states \" I need to see a doctor for my psych meds, for my psych need to be changed, I don't have the right meds\".  Pt mumble words and needs to repeat self to be heard.     1430: pt requested phone number for Cali Richey from [place],  Pt has c/o nausea, migraine, and anxiety.  Gave Atarax 50mg, Imitrex 50 mg, Phenergan 25 mg.  Requested to speak with .  Florinda LARSON meet with pt.       Problem: Suicide Risk (Adult)  Goal: Strength-Based Wellness/Recovery  Patient will demonstrate the desired outcomes by discharge/transition of care.  Pt will report no suicidal ideation.  Pt will report reduction in depression and anxiety to manageable level.     Pt denies SI       "

## 2018-04-26 NOTE — PLAN OF CARE
Problem: Patient Care Overview  Goal: Team Discussion  Team Plan:   BEHAVIORAL TEAM DISCUSSION    Participants: Kiel Blair NP, Jody Sheets NP,  Florinda Julien [facility], Chikis LARSON, Sana Baird RN, Zion Aguirre RN. Amrita Garcia [facility], India Arthur OT, Jackelin Wynn OT  Progress: New patient  Continued Stay Criteria/Rationale: SI. Restarted meds. Other routine labs were notable for elevated BG. Monitor for target symptoms: improved mood, decrease in SI.  Monitor for alcohol withdrawal, will order MSSA if needed  Medical/Physical: Diabetes, TBI.  Precautions:   Behavioral Orders   Procedures     Code 1 - Restrict to Unit     Routine Programming     As clinically indicated     Status 15     Every 15 minutes.     Plan: Stabilize on medications and DC to [facility].   Rationale for change in precautions or plan: none

## 2018-04-26 NOTE — PLAN OF CARE
Face to face end of shift report received from pm RN. Rounding completed. Patient observed.     Nic Carter  4/25/2018  11:45 PM

## 2018-04-26 NOTE — PLAN OF CARE
Face to face end of shift report received from LUDWIG Motta RN. Rounding completed. Patient observed, resting in bed with eyes closed.     Babatunde Garcia  4/26/2018  4:19 PM

## 2018-04-27 LAB
GLUCOSE BLDC GLUCOMTR-MCNC: 278 MG/DL (ref 70–99)
GLUCOSE BLDC GLUCOMTR-MCNC: 341 MG/DL (ref 70–99)
GLUCOSE BLDC GLUCOMTR-MCNC: 356 MG/DL (ref 70–99)

## 2018-04-27 PROCEDURE — 99232 SBSQ HOSP IP/OBS MODERATE 35: CPT | Performed by: NURSE PRACTITIONER

## 2018-04-27 PROCEDURE — 25000132 ZZH RX MED GY IP 250 OP 250 PS 637: Performed by: NURSE PRACTITIONER

## 2018-04-27 PROCEDURE — 00000146 ZZHCL STATISTIC GLUCOSE BY METER IP

## 2018-04-27 PROCEDURE — 12400000 ZZH R&B MH

## 2018-04-27 PROCEDURE — 25000132 ZZH RX MED GY IP 250 OP 250 PS 637: Performed by: PHYSICIAN ASSISTANT

## 2018-04-27 PROCEDURE — 25000132 ZZH RX MED GY IP 250 OP 250 PS 637: Performed by: FAMILY MEDICINE

## 2018-04-27 RX ORDER — GABAPENTIN 300 MG/1
300 CAPSULE ORAL 3 TIMES DAILY
Status: DISCONTINUED | OUTPATIENT
Start: 2018-04-27 | End: 2018-04-30 | Stop reason: HOSPADM

## 2018-04-27 RX ORDER — BUPROPION HYDROCHLORIDE 150 MG/1
150 TABLET ORAL ONCE
Status: COMPLETED | OUTPATIENT
Start: 2018-04-27 | End: 2018-04-27

## 2018-04-27 RX ORDER — BUPROPION HYDROCHLORIDE 150 MG/1
300 TABLET ORAL DAILY
Status: DISCONTINUED | OUTPATIENT
Start: 2018-04-28 | End: 2018-04-30 | Stop reason: HOSPADM

## 2018-04-27 RX ORDER — LANOLIN ALCOHOL/MO/W.PET/CERES
9 CREAM (GRAM) TOPICAL AT BEDTIME
Status: DISCONTINUED | OUTPATIENT
Start: 2018-04-27 | End: 2018-04-30 | Stop reason: HOSPADM

## 2018-04-27 RX ADMIN — BUPROPION HYDROCHLORIDE 150 MG: 150 TABLET, FILM COATED, EXTENDED RELEASE ORAL at 08:08

## 2018-04-27 RX ADMIN — GABAPENTIN 300 MG: 300 CAPSULE ORAL at 21:43

## 2018-04-27 RX ADMIN — GLYBURIDE 5 MG: 5 TABLET ORAL at 17:07

## 2018-04-27 RX ADMIN — LORATADINE 10 MG: 10 TABLET ORAL at 08:08

## 2018-04-27 RX ADMIN — MELATONIN TAB 3 MG 9 MG: 3 TAB at 21:43

## 2018-04-27 RX ADMIN — PANTOPRAZOLE SODIUM 40 MG: 40 TABLET, DELAYED RELEASE ORAL at 06:11

## 2018-04-27 RX ADMIN — GABAPENTIN 300 MG: 300 CAPSULE ORAL at 13:11

## 2018-04-27 RX ADMIN — SUMATRIPTAN 50 MG: 25 TABLET, FILM COATED ORAL at 15:13

## 2018-04-27 RX ADMIN — QUETIAPINE FUMARATE 800 MG: 200 TABLET, FILM COATED ORAL at 21:43

## 2018-04-27 RX ADMIN — INSULIN ASPART 3 UNITS: 100 INJECTION, SOLUTION INTRAVENOUS; SUBCUTANEOUS at 08:33

## 2018-04-27 RX ADMIN — INSULIN ASPART 5 UNITS: 100 INJECTION, SOLUTION INTRAVENOUS; SUBCUTANEOUS at 17:08

## 2018-04-27 RX ADMIN — PANTOPRAZOLE SODIUM 40 MG: 40 TABLET, DELAYED RELEASE ORAL at 17:07

## 2018-04-27 RX ADMIN — BUPROPION HYDROCHLORIDE 150 MG: 150 TABLET, FILM COATED, EXTENDED RELEASE ORAL at 12:33

## 2018-04-27 ASSESSMENT — ACTIVITIES OF DAILY LIVING (ADL)
GROOMING: INDEPENDENT
DRESS: SCRUBS (BEHAVIORAL HEALTH);INDEPENDENT
ORAL_HYGIENE: INDEPENDENT

## 2018-04-27 NOTE — PLAN OF CARE
"Problem: Patient Care Overview  Goal: Individualization & Mutuality  Pt will sleep 6-8 hours.  Pt will eat at least 50% of meals.  Pt will attend 50% of groups.     Outcome: No Change  Pt has been up and about on the unit most of the shift. Keeps mostly to self. Did attend group briefly this shift. Reported continued depression and anxiety, but denied SI. Did c/o migraine headache--rated 8/10. Repeat dose of PRN Imitrex 50 mg PO given at approximately 1645 with little effect. Pt also with repeated c/o \"heartburn.\" PRN Maalox given x1 with little relief. Provider informed of pt's continued c/o heartburn. Order received to change daily Protonix order to BID. Second dose of Protonix given this evening. Accucheck before supper meal 290 mg/dl. Pt took scheduled Glyburide, but refused insulin sliding scale coverage. Attempted to educate pt regarding elevated blood sugar and the need for insulin coverage, but pt still refused.     Problem: Suicide Risk (Adult)  Goal: Strength-Based Wellness/Recovery  Patient will demonstrate the desired outcomes by discharge/transition of care.  Pt will report no suicidal ideation.  Pt will report reduction in depression and anxiety to manageable level.     Outcome: No Change  Continued anxiety and depression, but denied SI.   Goal: Physical Safety  Patient will demonstrate the desired outcomes by discharge/transition of care.   Outcome: No Change  Remained free from self-harm and/or injury this shift.      "

## 2018-04-27 NOTE — PLAN OF CARE
Face to face end of shift report received from WERNER Lucero. Rounding completed. [facility] observed in [facility] [place] [facility].     Zion Aguirre  4/27/2018  7:42 AM

## 2018-04-27 NOTE — PLAN OF CARE
Problem: Patient Care Overview  Goal: Team Discussion  Team Plan:   BEHAVIORAL TEAM DISCUSSION    Participants: Mini Madera NP, Jackelin Hollins NP,  Xena Rowell NP,Florinda Julien [facility], Sana Baird RN, Zion Aguirre RN., Jackelin Wynn OT  Progress: Fair: not endorsing SI  Continued Stay Criteria/Rationale: Medication adjustments: will monitor for effectiveness and side effects  Medical/Physical: Diabetes  Precautions:   Behavioral Orders   Procedures     Code 1 - Restrict to Unit     Routine Programming     As clinically indicated     Status 15     Every 15 minutes.     Plan: discharge to [facility] on Monday  Rationale for change in precautions or plan: n/a

## 2018-04-27 NOTE — PLAN OF CARE
"Problem: Patient Care Overview  Goal: Individualization & Mutuality  Pt will sleep 6-8 hours.  Pt will eat at least 50% of meals.  Pt will attend 50% of groups.     He is up on the unit. He is social with others. He sits in the lounge. He did not attend group. He has anxiety and depression. His medications are being adjusted by the provider. He is anticipating discharge on Monday to [facility]. He is maintaining appropriate boundaries with staff and peers.     His blood sugar prior to lunch was 356. He declined novolog coverage of 5 units stating \"I really don't think I need that, I will wait and check my blood later and see\".  He is told that 356 is high and he is better off keeping his blood sugars under better control and he continued to refuse. He was compliant with his other medication changes of increased wellbutrin and addition of gabapentin.     Problem: Suicide Risk (Adult)  Goal: Strength-Based Wellness/Recovery  Patient will demonstrate the desired outcomes by discharge/transition of care.  Pt will report no suicidal ideation.  Pt will report reduction in depression and anxiety to manageable level.     He is not actively suicidal. He is appropriate on the unit.     Patient requested and received imitrex 50mg at 1513 for complaints of a migraine. He is again talked to about managing his blood sugars and taking better care of himself. He is agreeable at this time to just think about it.  "

## 2018-04-27 NOTE — DISCHARGE INSTRUCTIONS
Behavioral Discharge Planning and Instructions    Summary: rA was admitted to [address] [place] [address] for suicidal ideation    Main Diagnosis: Schizoaffective disorder, bipolar type    Major Treatments, Procedures and Findings: Stabilize with medications, connect with community programs.    Symptoms to Report: feeling more aggressive, increased confusion, losing more sleep, mood getting worse or thoughts of suicide    Lifestyle Adjustment: Take all medications as prescribed, meet with doctor/ medication provider, out patient therapist, , and [facility] worker as scheduled. Abstain from alcohol or any unprescribed drugs.    Psychiatry Follow-up:   [facility]: Medication Management: Melchor Sandoval NP Thursday, May 10th @ 10:45 am  PCP: Jessica Dye Friday, May 11, 1:15 pm  [address] [place] [address]  Phone: [phone]  Fax: [phone]      [facility]  [address] [place] [address]  Phone: [phone]  Fax: [phone]      [facility]:   [address] [place] [address]  Phone: [phone]  Fax: [phone]      [facility]:  Libertad Tao  [address] [facility] [address], [place] [address]  Phone:  [phone]   Fax - [phone]      [facility]: Call or go to if you are in crisis  [address] [place]  [address]  Phone - [phone]  Fax - [phone]        Resources:   Crisis Intervention: 878.319.6250 or 331-424-8441 (TTY: 791.652.5414).  Call anytime for help.  National [place] on Mental Illness (www.mn.nagi.org): 645.165.6800 or 704-871-8589.  Alcoholics Anonymous (www.alcoholics-anonymous.org): Check your phone book for your local chapter.  Suicide Awareness Voices of Education (SAVE) (www.save.org): 922-677-ETEL (8854)  National Suicide Prevention Line (www.mentalhealthmn.org): 601-974-CCYN (0826)  Mental Health Consumer/Survivor Network of [place] (www.mhcsn.net): 149-077-4180 or 670-217-6881  Mental Health  Association of [place] (www.mentalhealth.org): 620.533.7754 or 554-019-9403    General Medication Instructions:   See your medication sheet(s) for instructions.   Take all medicines as directed.  Make no changes unless your doctor suggests them.   Go to all your doctor visits.  Be sure to have all your required lab tests. This way, your medicines can be refilled on time.  Do not use any drugs not prescribed by your doctor.  Avoid alcohol.    [place] Area:  [facility], Crisis stabilization [facility]- [phone]  [facility] Crisis Line: 1-658.894.4321  Adv[facility]ates For Family Peace: 685-3364  Sexual Assault Program [facility]: [phone] or 1-572.898.1889  [place] Battered Women's Program: 1-403.874.3047 Ext: 279       Calls answered Mon-Fri-8:00 am--4:30 pm    [place]:  [facility] for Family Peace: 1-607.749.3816  [facility] first call for help: 4-311-582-4750  [facility]:  (389) 121-1914      [place] Area:  Warm Line: 1-572.140.8292       Calls answered Tuesday--Saturday 4:00 pm--10:00 pm  [facility] Crisis Line - [phone]  [place] [facility] 574-295-1821    [place] Statewide:  [place] Crisis and Referral Services: 1-223.756.8287  National Suicide Prevention Lifeline: 4-508-381-TALK (7506)   - ugv0xgxj- Text  Life  to [address]  First Call for Help: 2-1-1  [facility] Helpline- 5-602-ZULD-HELP

## 2018-04-27 NOTE — PROGRESS NOTES
[facility]  Psychiatric Progress Note      Impression:   This is a 37 year old yo male with a history of schizoaffective disorder-bipolar type.    Ar is sitting in the lounge when I see him today. He appears drowsy, but tells me he is not and that he just woke up. He makes very little eye contact during our conversation. He tells me that he still feels depressed and anxious. We agree to increase his antidepressant and add Gabapentin for anxiety, which also may help with his pain concerns. He denies any active suicidal thoughts. He does not attend groups, but does sit up in the lounge area. His behavior has been appropriate, he does have a history of impulsivity and aggression. I believe that he has been sleeping well at night. Plan is for discharge on Monday to [facility].     Educated regarding medication indications, risks, benefits, side effects, contraindications and possible interactions. Verbally expressed understanding.        DIagnoses:   Schizoaffective disorder, bipolar type  R/o alcohol use disorder, moderate  R/o TBI (per pt) vs intellectual disability      Attestation:  Patient has been seen and evaluated by me,  Xena Rowell NP          Interim History:   The patient's care was discussed with the treatment team and chart notes were reviewed.          Medications:     Current Facility-Administered Medications Ordered in Epic   Medication Dose Route Frequency Last Rate Last Dose     acetaminophen (TYLENOL) tablet 650 mg  650 mg Oral Q4H PRN   650 mg at 04/26/18 0749     albuterol (PROAIR HFA/PROVENTIL HFA/VENTOLIN HFA) Inhaler 2 puff  2 puff Inhalation Q4H PRN         alum & mag hydroxide-simethicone (MYLANTA ES/MAALOX  ES) suspension 30 mL  30 mL Oral Q6H PRN   30 mL at 04/26/18 1652     buPROPion (WELLBUTRIN XL) 24 hr tablet 150 mg  150 mg Oral Once         [START ON 4/28/2018] buPROPion (WELLBUTRIN XL) 24 hr tablet 300 mg  300 mg Oral Daily         glucose gel 15-30 g  15-30 g  Oral Q15 Min PRN        Or     dextrose 50 % injection 25-50 mL  25-50 mL Intravenous Q15 Min PRN        Or     glucagon injection 1 mg  1 mg Subcutaneous Q15 Min PRN         fluticasone (FLONASE) 50 MCG/ACT spray 1 spray  1 spray Both Nostrils Daily         gabapentin (NEURONTIN) capsule 300 mg  300 mg Oral TID         glyBURIDE (DIABETA /MICRONASE) tablet 5 mg  5 mg Oral Daily with supper   5 mg at 04/26/18 1728     hydrOXYzine (ATARAX) tablet 25-50 mg  25-50 mg Oral Q4H PRN   50 mg at 04/26/18 1426     insulin aspart (NovoLOG) inj (RAPID ACTING)  1-7 Units Subcutaneous TID AC   3 Units at 04/27/18 0833     loratadine (CLARITIN) tablet 10 mg  10 mg Oral Daily   10 mg at 04/27/18 0808     magic mouthwash suspension (diphenhydrAMINE, lidocaine, aluminum-magnesium & simethicone)  10 mL Swish & Swallow Q6H PRN         magnesium hydroxide (MILK OF MAGNESIA) suspension 30 mL  30 mL Oral At Bedtime PRN         melatonin tablet 9 mg  9 mg Oral At Bedtime         nicotine polacrilex (NICORETTE) gum 2-4 mg  2-4 mg Buccal Q1H PRN         OLANZapine (zyPREXA) tablet 10 mg  10 mg Oral Q2H PRN        Or     OLANZapine (zyPREXA) injection 10 mg  10 mg Intramuscular Q2H PRN         pantoprazole (PROTONIX) EC tablet 40 mg  40 mg Oral BID AC   40 mg at 04/27/18 0611     promethazine (PHENERGAN) tablet 25 mg  25 mg Oral Q6H PRN   25 mg at 04/26/18 1433     QUEtiapine (SEROquel) tablet 800 mg  800 mg Oral At Bedtime   800 mg at 04/26/18 2132     SUMAtriptan (IMITREX) tablet 50 mg  50 mg Oral at onset of headache   50 mg at 04/26/18 1651     traZODone (DESYREL) tablet 50 mg  50 mg Oral At Bedtime PRN         No current Epic-ordered outpatient prescriptions on file.          10 point ROS reviewed- reports chronic pain, elevated BG       Allergies:     Allergies   Allergen Reactions     Penicillin G Hives     Tuna Flavor Anaphylaxis     Fish-Derived Products      Fish allergy, especially Perch.     No Clinical Screening - See Comments   "    Tuna and shell fish     Shellfish-Derived Products Itching     Penicillins Rash            Psychiatric Examination:   /55  Pulse 71  Temp 97.2  F (36.2  C) (Tympanic)  Resp 14  Ht 1.778 m (5' 10\")  Wt 104.8 kg (231 lb)  SpO2 95%  BMI 33.15 kg/m2  Weight is 231 lbs 0 oz  Body mass index is 33.15 kg/(m^2).    Appearance:  awake, alert and slightly unkempt  Attitude:  cooperative and guarded  Eye Contact:  Poor  Mood:  anxious and depressed  Affect:  intensity is blunted  Speech:  mumbling though coherent  Psychomotor Behavior:  no evidence of tardive dyskinesia, dystonia, or tics  Thought Process:  linear and goal oriented  Associations:  no loose associations  Thought Content:  no evidence of suicidal ideation or homicidal ideation and no evidence of psychotic thought  Insight:  limited  Judgment:  limited  Oriented to:  time, person, and place  Attention Span and Concentration:  fair  Recent and Remote Memory:  fair  Fund of Knowledge: delayed  Muscle Strength and Tone: normal  Gait and Station: Normal           Labs:     Results for orders placed or performed during the hospital encounter of 04/24/18 (from the past 24 hour(s))   Glucose by meter   Result Value Ref Range    Glucose 290 (H) 70 - 99 mg/dL   Glucose by meter   Result Value Ref Range    Glucose 278 (H) 70 - 99 mg/dL   Glucose by meter   Result Value Ref Range    Glucose 356 (H) 70 - 99 mg/dL            Plan:   Increase Wellbutrin XL to 300 mg daily  Start Gabapentin 300 mg TID  Change Protonix to BID  Continue other medications  Diet changed to consistent carbs  Will discharge Monday AM to [facility]    [place]: 3 more days for medication adjustments, monitoring    "
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
## 2018-04-27 NOTE — PLAN OF CARE
Problem: Patient Care Overview  Goal: Discharge Needs Assessment  Writer spoke with Terri Umanzor at [facility] and they will take him at 10 am on Monday.  Writer spoke with pt's  Libertad Tao and she will pick him up at 9 am on Monday and go to [place] to get his belongings and then transport him to [facility].  Writer spoke to pt several times about this and he was in agreement with the plan.  Writer provided him with his 's phone number.  He has not yet met her.

## 2018-04-28 LAB
GLUCOSE BLDC GLUCOMTR-MCNC: 297 MG/DL (ref 70–99)
GLUCOSE BLDC GLUCOMTR-MCNC: 317 MG/DL (ref 70–99)
GLUCOSE BLDC GLUCOMTR-MCNC: 321 MG/DL (ref 70–99)
GLUCOSE BLDC GLUCOMTR-MCNC: 340 MG/DL (ref 70–99)

## 2018-04-28 PROCEDURE — 00000146 ZZHCL STATISTIC GLUCOSE BY METER IP

## 2018-04-28 PROCEDURE — 25000132 ZZH RX MED GY IP 250 OP 250 PS 637: Performed by: PHYSICIAN ASSISTANT

## 2018-04-28 PROCEDURE — 25000132 ZZH RX MED GY IP 250 OP 250 PS 637: Performed by: NURSE PRACTITIONER

## 2018-04-28 PROCEDURE — 25000132 ZZH RX MED GY IP 250 OP 250 PS 637: Performed by: FAMILY MEDICINE

## 2018-04-28 PROCEDURE — 99232 SBSQ HOSP IP/OBS MODERATE 35: CPT | Performed by: NURSE PRACTITIONER

## 2018-04-28 PROCEDURE — 12400000 ZZH R&B MH

## 2018-04-28 RX ADMIN — SUMATRIPTAN 50 MG: 25 TABLET, FILM COATED ORAL at 21:17

## 2018-04-28 RX ADMIN — PANTOPRAZOLE SODIUM 40 MG: 40 TABLET, DELAYED RELEASE ORAL at 08:28

## 2018-04-28 RX ADMIN — LORATADINE 10 MG: 10 TABLET ORAL at 08:28

## 2018-04-28 RX ADMIN — MELATONIN TAB 3 MG 9 MG: 3 TAB at 21:17

## 2018-04-28 RX ADMIN — PANTOPRAZOLE SODIUM 40 MG: 40 TABLET, DELAYED RELEASE ORAL at 06:03

## 2018-04-28 RX ADMIN — INSULIN ASPART 4 UNITS: 100 INJECTION, SOLUTION INTRAVENOUS; SUBCUTANEOUS at 08:25

## 2018-04-28 RX ADMIN — SUMATRIPTAN 50 MG: 25 TABLET, FILM COATED ORAL at 08:27

## 2018-04-28 RX ADMIN — GLYBURIDE 5 MG: 5 TABLET ORAL at 17:01

## 2018-04-28 RX ADMIN — BUPROPION HYDROCHLORIDE 300 MG: 150 TABLET, FILM COATED, EXTENDED RELEASE ORAL at 08:27

## 2018-04-28 RX ADMIN — GABAPENTIN 300 MG: 300 CAPSULE ORAL at 21:17

## 2018-04-28 RX ADMIN — GABAPENTIN 300 MG: 300 CAPSULE ORAL at 14:45

## 2018-04-28 RX ADMIN — PANTOPRAZOLE SODIUM 40 MG: 40 TABLET, DELAYED RELEASE ORAL at 16:30

## 2018-04-28 RX ADMIN — GABAPENTIN 300 MG: 300 CAPSULE ORAL at 08:28

## 2018-04-28 RX ADMIN — INSULIN ASPART 4 UNITS: 100 INJECTION, SOLUTION INTRAVENOUS; SUBCUTANEOUS at 11:47

## 2018-04-28 RX ADMIN — INSULIN ASPART 5 UNITS: 100 INJECTION, SOLUTION INTRAVENOUS; SUBCUTANEOUS at 17:01

## 2018-04-28 RX ADMIN — QUETIAPINE FUMARATE 800 MG: 200 TABLET, FILM COATED ORAL at 21:17

## 2018-04-28 ASSESSMENT — ACTIVITIES OF DAILY LIVING (ADL)
LAUNDRY: UNABLE TO COMPLETE
LAUNDRY: UNABLE TO COMPLETE
GROOMING: INDEPENDENT
ORAL_HYGIENE: INDEPENDENT
DRESS: SCRUBS (BEHAVIORAL HEALTH)
DRESS: INDEPENDENT;SCRUBS (BEHAVIORAL HEALTH)
ORAL_HYGIENE: INDEPENDENT
GROOMING: INDEPENDENT

## 2018-04-28 NOTE — PLAN OF CARE
Face to face end of shift report received from Nic NORMAN RN. Rounding completed. Patient observed.     Anna Rogers  4/28/2018  8:12 AM

## 2018-04-28 NOTE — PLAN OF CARE
Face to face end of shift report received from Anna MUNROE RN. Rounding completed. Patient observed in [facility].     Vikki Saldivar  4/28/2018  3:59 PM

## 2018-04-28 NOTE — PLAN OF CARE
"Problem: Patient Care Overview  Goal: Individualization & Mutuality  Pt will sleep 6-8 hours.  Pt will eat at least 50% of meals.  Pt will attend 50% of groups.     Outcome: Therapy, progress towards functional goals is fair  Patient presents calm, and cooperative. Patient very drowsy, often napping, and drifting off to sleep. Patient denies all criteria, however presents depressed, isolative, and withdrawn. Patient does note some cramping in his abdomen, legs, and feet. Patient took medications as prescribed. Patient states blood glucose is normally around the 300's, and this is \"good\" for him. Patient has not attended any groups. Patient has been free of self harm. Contracts for safety.       "

## 2018-04-28 NOTE — PLAN OF CARE
Face to face end of shift report received from pm RN. Rounding completed. Patient observed.     Nic Carter  4/27/2018  11:29 PM

## 2018-04-28 NOTE — PLAN OF CARE
"Problem: Patient Care Overview  Goal: Individualization & Mutuality  Pt will sleep 6-8 hours.  Pt will eat at least 50% of meals.  Pt will attend 50% of groups.     Outcome: No Change  Pt up and about on the unit this shift. Did not attend groups. Increased socialization with peers and staff. Pleasant and cooperative on assessment. Acknowledged continued anxiety and depression, but denied SI. Did c/o \"migraine\" again this shift--rated 8/10. Declined repeat dose of PRN Excedrin. Blood sugar before supper meal 341 mg/dl. Given 5 units sliding scale coverage. No c/o indigestion this shift.     Problem: Suicide Risk (Adult)  Goal: Strength-Based Wellness/Recovery  Patient will demonstrate the desired outcomes by discharge/transition of care.  Pt will report no suicidal ideation.  Pt will report reduction in depression and anxiety to manageable level.     Outcome: No Change  Reported continued anxiety and depression, but denied SI. Affect did appear brighter today and pt was noted to be more spontaneous in conversation than previously.   Goal: Physical Safety  Patient will demonstrate the desired outcomes by discharge/transition of care.   Outcome: No Change  Remained free from self-harm and/or injury this shift.      "

## 2018-04-29 LAB
GLUCOSE BLDC GLUCOMTR-MCNC: 302 MG/DL (ref 70–99)
GLUCOSE BLDC GLUCOMTR-MCNC: 314 MG/DL (ref 70–99)
GLUCOSE BLDC GLUCOMTR-MCNC: 388 MG/DL (ref 70–99)

## 2018-04-29 PROCEDURE — 00000146 ZZHCL STATISTIC GLUCOSE BY METER IP

## 2018-04-29 PROCEDURE — 25000132 ZZH RX MED GY IP 250 OP 250 PS 637: Performed by: FAMILY MEDICINE

## 2018-04-29 PROCEDURE — 25000132 ZZH RX MED GY IP 250 OP 250 PS 637: Performed by: NURSE PRACTITIONER

## 2018-04-29 PROCEDURE — 12400000 ZZH R&B MH

## 2018-04-29 PROCEDURE — 25000132 ZZH RX MED GY IP 250 OP 250 PS 637: Performed by: PHYSICIAN ASSISTANT

## 2018-04-29 RX ORDER — NICOTINE 21 MG/24HR
1 PATCH, TRANSDERMAL 24 HOURS TRANSDERMAL EVERY 24 HOURS
Qty: 28 PATCH | Refills: 0 | Status: ON HOLD | OUTPATIENT
Start: 2018-04-29 | End: 2018-12-28

## 2018-04-29 RX ORDER — FLUTICASONE PROPIONATE 50 MCG
2 SPRAY, SUSPENSION (ML) NASAL DAILY PRN
Qty: 1 BOTTLE | Refills: 0 | Status: ON HOLD | OUTPATIENT
Start: 2018-04-29 | End: 2018-12-28

## 2018-04-29 RX ORDER — QUETIAPINE FUMARATE 400 MG/1
800 TABLET, FILM COATED ORAL AT BEDTIME
Qty: 60 TABLET | Refills: 0 | Status: SHIPPED | OUTPATIENT
Start: 2018-04-29

## 2018-04-29 RX ORDER — LORATADINE 10 MG/1
10 TABLET ORAL DAILY
Qty: 30 TABLET | Refills: 0 | Status: ON HOLD | OUTPATIENT
Start: 2018-04-29 | End: 2018-12-28

## 2018-04-29 RX ORDER — ALBUTEROL SULFATE 90 UG/1
2 AEROSOL, METERED RESPIRATORY (INHALATION) EVERY 4 HOURS PRN
Qty: 1 INHALER | Refills: 0 | Status: SHIPPED | OUTPATIENT
Start: 2018-04-29

## 2018-04-29 RX ORDER — GLYBURIDE 5 MG/1
5 TABLET ORAL
Qty: 30 TABLET | Refills: 0 | Status: ON HOLD | OUTPATIENT
Start: 2018-04-29 | End: 2018-12-28

## 2018-04-29 RX ORDER — SUMATRIPTAN 25 MG/1
50 TABLET, FILM COATED ORAL
Qty: 14 TABLET | Refills: 0 | Status: SHIPPED | OUTPATIENT
Start: 2018-04-29

## 2018-04-29 RX ORDER — GABAPENTIN 300 MG/1
300 CAPSULE ORAL 3 TIMES DAILY
Qty: 90 CAPSULE | Refills: 0 | Status: ON HOLD | OUTPATIENT
Start: 2018-04-29 | End: 2018-12-28

## 2018-04-29 RX ORDER — BUPROPION HYDROCHLORIDE 300 MG/1
300 TABLET ORAL DAILY
Qty: 30 TABLET | Refills: 0 | Status: ON HOLD | OUTPATIENT
Start: 2018-04-30 | End: 2018-12-28

## 2018-04-29 RX ORDER — HYDROXYZINE HYDROCHLORIDE 25 MG/1
25-50 TABLET, FILM COATED ORAL EVERY 4 HOURS PRN
Qty: 90 TABLET | Refills: 0 | Status: ON HOLD | OUTPATIENT
Start: 2018-04-29 | End: 2018-12-28

## 2018-04-29 RX ORDER — PANTOPRAZOLE SODIUM 40 MG/1
40 TABLET, DELAYED RELEASE ORAL DAILY
Qty: 30 TABLET | Refills: 0 | Status: ON HOLD | OUTPATIENT
Start: 2018-04-29 | End: 2018-12-28

## 2018-04-29 RX ORDER — PROMETHAZINE HYDROCHLORIDE 25 MG/1
25 TABLET ORAL EVERY 6 HOURS PRN
Qty: 30 TABLET | Refills: 0 | Status: ON HOLD | OUTPATIENT
Start: 2018-04-29 | End: 2018-12-28

## 2018-04-29 RX ADMIN — GABAPENTIN 300 MG: 300 CAPSULE ORAL at 08:55

## 2018-04-29 RX ADMIN — GABAPENTIN 300 MG: 300 CAPSULE ORAL at 21:52

## 2018-04-29 RX ADMIN — GLYBURIDE 5 MG: 5 TABLET ORAL at 16:55

## 2018-04-29 RX ADMIN — MELATONIN TAB 3 MG 9 MG: 3 TAB at 21:52

## 2018-04-29 RX ADMIN — LORATADINE 10 MG: 10 TABLET ORAL at 08:54

## 2018-04-29 RX ADMIN — PANTOPRAZOLE SODIUM 40 MG: 40 TABLET, DELAYED RELEASE ORAL at 16:55

## 2018-04-29 RX ADMIN — GABAPENTIN 300 MG: 300 CAPSULE ORAL at 14:26

## 2018-04-29 RX ADMIN — QUETIAPINE FUMARATE 800 MG: 200 TABLET, FILM COATED ORAL at 21:52

## 2018-04-29 RX ADMIN — BUPROPION HYDROCHLORIDE 300 MG: 150 TABLET, FILM COATED, EXTENDED RELEASE ORAL at 08:55

## 2018-04-29 RX ADMIN — PANTOPRAZOLE SODIUM 40 MG: 40 TABLET, DELAYED RELEASE ORAL at 06:02

## 2018-04-29 ASSESSMENT — ACTIVITIES OF DAILY LIVING (ADL)
DRESS: SCRUBS (BEHAVIORAL HEALTH)
GROOMING: INDEPENDENT
GROOMING: INDEPENDENT
DRESS: SCRUBS (BEHAVIORAL HEALTH)
ORAL_HYGIENE: INDEPENDENT
LAUNDRY: UNABLE TO COMPLETE
LAUNDRY: UNABLE TO COMPLETE
ORAL_HYGIENE: INDEPENDENT

## 2018-04-29 NOTE — PLAN OF CARE
Face to face end of shift report received from Nic NORMAN RN. Rounding completed. Patient observed.     Anna Rogers  4/29/2018  7:25 AM

## 2018-04-29 NOTE — PROGRESS NOTES
"[facility]  Psychiatric Progress Note      Impression:   This is a 37 year old yo male with a history of schizoaffective disorder-bipolar type.    Ar is sitting in the lounge and agrees to speak with me in his room. He reports that he is \"a little better\" today. He does not appear as tired today and is alert throughout our conversation. We discussed his upcoming discharge on Monday, which he is looking forward to. He is hopeful that this will be a better placement than the [facility]. Does report that the medications \"are starting to help\". Won't make any changes today. Did have a long discussion about his elevated blood glucose and treatment of diabetes. About once a day he has refused one dose of sliding scale insulin when his sugars have been 300+. He reports that this is \"normally what they are\". He is upset that he is on a consistent carb diet and requests that he be given a regular diet with double portions. We discussed healthy eating and exercise as ways to keep his blood sugars down, as he is not interested in continuing insulin and would rather \"take a pill\". He does talk about how his mother had diabetes and he would help her check her blood sugars, discussed complications that can arise from untreated diabetes, which he verbalizes understanding of. Plan is for discharge on Monday to [facility] and will follow-up with outpatient appointments including psych, PCP, sleep medicine, diabetes education, and ENT.     Educated regarding medication indications, risks, benefits, side effects, contraindications and possible interactions. Verbally expressed understanding.        DIagnoses:   Schizoaffective disorder, bipolar type  R/o alcohol use disorder, moderate  Intellectual disability (per records)      Attestation:  Patient has been seen and evaluated by me,  Xena Rowell NP          Interim History:   The patient's care was discussed with the treatment team " and chart notes were reviewed.          Medications:     Current Facility-Administered Medications Ordered in Epic   Medication Dose Route Frequency Last Rate Last Dose     acetaminophen (TYLENOL) tablet 650 mg  650 mg Oral Q4H PRN   650 mg at 04/26/18 0749     albuterol (PROAIR HFA/PROVENTIL HFA/VENTOLIN HFA) Inhaler 2 puff  2 puff Inhalation Q4H PRN         alum & mag hydroxide-simethicone (MYLANTA ES/MAALOX  ES) suspension 30 mL  30 mL Oral Q6H PRN   30 mL at 04/26/18 1652     buPROPion (WELLBUTRIN XL) 24 hr tablet 150 mg  150 mg Oral Once         [START ON 4/28/2018] buPROPion (WELLBUTRIN XL) 24 hr tablet 300 mg  300 mg Oral Daily         glucose gel 15-30 g  15-30 g Oral Q15 Min PRN        Or     dextrose 50 % injection 25-50 mL  25-50 mL Intravenous Q15 Min PRN        Or     glucagon injection 1 mg  1 mg Subcutaneous Q15 Min PRN         fluticasone (FLONASE) 50 MCG/ACT spray 1 spray  1 spray Both Nostrils Daily         gabapentin (NEURONTIN) capsule 300 mg  300 mg Oral TID         glyBURIDE (DIABETA /MICRONASE) tablet 5 mg  5 mg Oral Daily with supper   5 mg at 04/26/18 1728     hydrOXYzine (ATARAX) tablet 25-50 mg  25-50 mg Oral Q4H PRN   50 mg at 04/26/18 1426     insulin aspart (NovoLOG) inj (RAPID ACTING)  1-7 Units Subcutaneous TID AC   3 Units at 04/27/18 0833     loratadine (CLARITIN) tablet 10 mg  10 mg Oral Daily   10 mg at 04/27/18 0808     magic mouthwash suspension (diphenhydrAMINE, lidocaine, aluminum-magnesium & simethicone)  10 mL Swish & Swallow Q6H PRN         magnesium hydroxide (MILK OF MAGNESIA) suspension 30 mL  30 mL Oral At Bedtime PRN         melatonin tablet 9 mg  9 mg Oral At Bedtime         nicotine polacrilex (NICORETTE) gum 2-4 mg  2-4 mg Buccal Q1H PRN         OLANZapine (zyPREXA) tablet 10 mg  10 mg Oral Q2H PRN        Or     OLANZapine (zyPREXA) injection 10 mg  10 mg Intramuscular Q2H PRN         pantoprazole (PROTONIX) EC tablet 40 mg  40 mg Oral BID AC   40 mg at 04/27/18  "0611     promethazine (PHENERGAN) tablet 25 mg  25 mg Oral Q6H PRN   25 mg at 04/26/18 1433     QUEtiapine (SEROquel) tablet 800 mg  800 mg Oral At Bedtime   800 mg at 04/26/18 2132     SUMAtriptan (IMITREX) tablet 50 mg  50 mg Oral at onset of headache   50 mg at 04/26/18 1651     traZODone (DESYREL) tablet 50 mg  50 mg Oral At Bedtime PRN         No current Epic-ordered outpatient prescriptions on file.          10 point ROS reviewed- reports chronic pain, elevated BG       Allergies:     Allergies   Allergen Reactions     Penicillin G Hives     Tuna Flavor Anaphylaxis     Fish-Derived Products      Fish allergy, especially Perch.     No Clinical Screening - See Comments      Tuna and shell fish     Shellfish-Derived Products Itching     Penicillins Rash            Psychiatric Examination:   /70  Pulse 70  Temp 97.5  F (36.4  C) (Tympanic)  Resp 18  Ht 1.778 m (5' 10\")  Wt 107.3 kg (236 lb 9.6 oz)  SpO2 97%  BMI 33.95 kg/m2  Weight is 236 lbs 9.6 oz  Body mass index is 33.95 kg/(m^2).    Appearance:  Awake, alert, dressed in hospital scrubs, casually groomed  Attitude: cooperative, pleasant today  Eye Contact: poor, makes eye contact for brief periods of time though will then look away  Mood: anxious and depressed, though improving  Affect: blunted, though does smile at times during our conversation  Speech: somewhat mumbled though coherent   Psychomotor Behavior:  no evidence of tardive dyskinesia, dystonia, or tics  Thought Process: linear, goal oriented  Associations:  no loose associations  Thought Content:  no evidence of suicidal ideation or homicidal ideation and no evidence of psychotic thought  Insight:  limited  Judgment:  limited  Oriented to:  time, person, and place  Attention Span and Concentration:  fair  Recent and Remote Memory:  fair  Fund of Knowledge: delayed  Muscle Strength and Tone: normal  Gait and Station: Normal           Labs:     Results for orders placed or performed " during the hospital encounter of 04/24/18 (from the past 24 hour(s))   Glucose by meter   Result Value Ref Range    Glucose 317 (H) 70 - 99 mg/dL   Glucose by meter   Result Value Ref Range    Glucose 340 (H) 70 - 99 mg/dL   Glucose by meter   Result Value Ref Range    Glucose 314 (H) 70 - 99 mg/dL            Plan:   Continue current medications  Cepacol lozenges for sore throat  Will discharge Monday AM to [facility]    [place]: 2 more days for medication adjustments, monitoring

## 2018-04-29 NOTE — PLAN OF CARE
Problem: Patient Care Overview  Goal: Individualization & Mutuality  Pt will sleep 6-8 hours.  Pt will eat at least 50% of meals.  Pt will attend 50% of groups.     Outcome: Improving  Patient has been calm, cooperative, and medication compliant this shift.  He attended groups and was social with peers and staff in the [facility] area.  States he is still depressed but is doing better.  Feels ready for discharge on Monday.   at 1630, 5 units Novolog given at 1700.Complained of headache and given Imitrex 50 mg at 2117.  Still awake watching tv as of this writing at 2200.  VS WNL.     Problem: Suicide Risk (Adult)  Goal: Identify Related Risk Factors and Signs and Symptoms  Related risk factors and signs and symptoms are identified upon initiation of Human [employer] Clinical Practice Guideline (CPG).   Outcome: Improving  Patient denies suicidal thoughts this shift.

## 2018-04-29 NOTE — PLAN OF CARE
"Problem: Patient Care Overview  Goal: Individualization & Mutuality  Pt will sleep 6-8 hours.  Pt will eat at least 50% of meals.  Pt will attend 50% of groups.     Outcome: Therapy, progress towards functional goals is fair  Patient lethargic upon initial assessment. Patient sleeping majority of morning. Patient irritable when woken to take AM medications. Patient refused to take medications with breakfast. Patient did take oral prescribed medications, however refused AM insulin. Patient affect flat, irritable, unwilling to answer assessment questions. Patient has not attended groups. Patient was out watching TV with peers, however not interacting or social. Patients blood glucose was 388 prior to lunch. Patient once again refused afternoon insulin. Patient encouraged to take insulin. Patient irritable and stated he didn't need it that his blood glucose was \"normal\" and \"good for him.\" Patient continues to request double portions. Patient given information about diabetic diet, and normal ranges for blood glucose. Patient disagrees. Patient has remained free of self harm, appears flat and depressed. Patients speech mumbled and slurred, hard to understand at times.       "

## 2018-04-29 NOTE — PLAN OF CARE
Face to face end of shift report received from pm RN. Rounding completed. Patient observed.     Nic Carter  4/28/2018  11:32 PM

## 2018-04-30 VITALS
DIASTOLIC BLOOD PRESSURE: 75 MMHG | BODY MASS INDEX: 33.87 KG/M2 | WEIGHT: 236.6 LBS | OXYGEN SATURATION: 99 % | SYSTOLIC BLOOD PRESSURE: 119 MMHG | TEMPERATURE: 97.4 F | HEIGHT: 70 IN | HEART RATE: 89 BPM | RESPIRATION RATE: 16 BRPM

## 2018-04-30 LAB — GLUCOSE BLDC GLUCOMTR-MCNC: 306 MG/DL (ref 70–99)

## 2018-04-30 PROCEDURE — 00000146 ZZHCL STATISTIC GLUCOSE BY METER IP

## 2018-04-30 PROCEDURE — 25000132 ZZH RX MED GY IP 250 OP 250 PS 637: Performed by: NURSE PRACTITIONER

## 2018-04-30 PROCEDURE — 99239 HOSP IP/OBS DSCHRG MGMT >30: CPT | Performed by: NURSE PRACTITIONER

## 2018-04-30 RX ADMIN — GABAPENTIN 300 MG: 300 CAPSULE ORAL at 08:34

## 2018-04-30 RX ADMIN — LORATADINE 10 MG: 10 TABLET ORAL at 08:34

## 2018-04-30 RX ADMIN — PANTOPRAZOLE SODIUM 40 MG: 40 TABLET, DELAYED RELEASE ORAL at 06:06

## 2018-04-30 RX ADMIN — BUPROPION HYDROCHLORIDE 300 MG: 150 TABLET, FILM COATED, EXTENDED RELEASE ORAL at 08:34

## 2018-04-30 RX ADMIN — SUMATRIPTAN 50 MG: 25 TABLET, FILM COATED ORAL at 08:44

## 2018-04-30 ASSESSMENT — ACTIVITIES OF DAILY LIVING (ADL)
ORAL_HYGIENE: INDEPENDENT
GROOMING: INDEPENDENT
DRESS: SCRUBS (BEHAVIORAL HEALTH);INDEPENDENT

## 2018-04-30 NOTE — PLAN OF CARE
Face to face end of shift report received from pm RN. Rounding completed. Patient observed.     Nic Carter  4/29/2018  11:34 PM

## 2018-04-30 NOTE — PLAN OF CARE
Problem: Patient Care Overview  Goal: Discharge Needs Assessment  Pt is discharging at the recommendation of the treatment team. Pt is discharging to [facility] transported by his . Pt denies having any thoughts of hurting themself or anyone else. Pt denies anxiety or depression. Pt has follow up with Raquel Ruiz.  Discharge instructions, including; demographic sheet, psychiatric evaluation, discharge summary, and AVS were faxed to these next level of care providers.
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
## 2018-04-30 NOTE — PLAN OF CARE
"Problem: Patient Care Overview  Goal: Individualization & Mutuality  Pt will sleep 6-8 hours.  Pt will eat at least 50% of meals.  Pt will attend 50% of groups.     Outcome: Adequate for Discharge Date Met: 04/30/18  Pt up on the unit this morning. Cooperative, though a bit irritable. Verbalized being ready to discharge to [facility] in [place], [place]. Denied depression, anxiety, and/or SI on assessment. Did report a \"migraine\" headache, rated 9/10. PRN Imitrex 50 mg PO given at approximately 0845.    Discharge Note    Patient discharged to [facility] in [place], [place] on 4/30/2018 at 9:30 AM via private car, accompanied by Libertad Tao ().     Patient informed of discharge instructions in AVS. Patient verbalizes understanding and denies having any questions pertaining to AVS. Patient stable at time of discharge. Patient denies SI, HI, and thoughts of self harm at time of discharge. All personal belongings returned to patient. Discharge [facility] via electronic communication. Psych evaluation, history and physical, AVS, and discharge summary faxed to next level of care by social work.     Babatunde Garcia  4/30/2018  10:14 AM          "

## 2018-04-30 NOTE — PLAN OF CARE
"Problem: Patient Care Overview  Goal: Individualization & Mutuality  Pt will sleep 6-8 hours.  Pt will eat at least 50% of meals.  Pt will attend 50% of groups.     Outcome: Improving  Patient has been calm and cooperative.  He refused to take his sliding scale insulin before supper.  BG was 302 and he states \"that's normal for me, I don't need the insulin.\"  Education provided regarding diabetes management but patient still declined.  Out in lounge all shift and social with peers and staff.  Did not attend groups.  Speech is slow and slurred but this is normal for him, states it's his \"accent.\"  No complaints of pain.  VS WNL.      Problem: Suicide Risk (Adult)  Goal: Strength-Based Wellness/Recovery  Patient will demonstrate the desired outcomes by discharge/transition of care.  Pt will report no suicidal ideation.  Pt will report reduction in depression and anxiety to manageable level.     Outcome: Improving  Patient denies suicidal thoughts this shift.  He reports that his depression and anxiety are manageable at this time.  He feels ready to discharge tomorrow morning.       "

## 2018-12-27 ENCOUNTER — TRANSFERRED RECORDS (OUTPATIENT)
Dept: HEALTH INFORMATION MANAGEMENT | Facility: CLINIC | Age: 38
End: 2018-12-27

## 2018-12-27 LAB
CREAT SERPL-MCNC: 0.85 MG/DL (ref 0.7–1.2)
GFR SERPL CREATININE-BSD FRML MDRD: >60 ML/MIN/1.73M2
GLUCOSE SERPL-MCNC: 106 MG/DL (ref 70–99)
POTASSIUM SERPL-SCNC: 4.3 MEQ/L (ref 3.4–5.1)

## 2018-12-28 ENCOUNTER — HOSPITAL ENCOUNTER (INPATIENT)
Facility: HOSPITAL | Age: 38
LOS: 6 days | Discharge: NURSING, REHAB OR LONG-TERM CARE | End: 2019-01-03
Attending: PSYCHIATRY & NEUROLOGY | Admitting: PSYCHIATRY & NEUROLOGY
Payer: COMMERCIAL

## 2018-12-28 DIAGNOSIS — R45.851 DEPRESSION WITH SUICIDAL IDEATION: Primary | ICD-10-CM

## 2018-12-28 DIAGNOSIS — F32.A DEPRESSION WITH SUICIDAL IDEATION: Primary | ICD-10-CM

## 2018-12-28 PROCEDURE — 25000132 ZZH RX MED GY IP 250 OP 250 PS 637: Performed by: NURSE PRACTITIONER

## 2018-12-28 PROCEDURE — 12400000 ZZH R&B MH

## 2018-12-28 RX ORDER — DEXLANSOPRAZOLE 30 MG/1
30 CAPSULE, DELAYED RELEASE ORAL DAILY
COMMUNITY

## 2018-12-28 RX ORDER — GABAPENTIN 600 MG/1
600 TABLET ORAL 3 TIMES DAILY
COMMUNITY

## 2018-12-28 RX ORDER — HYDROXYZINE HYDROCHLORIDE 25 MG/1
25-50 TABLET, FILM COATED ORAL EVERY 4 HOURS PRN
Status: DISCONTINUED | OUTPATIENT
Start: 2018-12-28 | End: 2018-12-29

## 2018-12-28 RX ORDER — BUSPIRONE HYDROCHLORIDE 10 MG/1
10 TABLET ORAL EVERY 4 HOURS PRN
Status: ON HOLD | COMMUNITY
End: 2019-01-02

## 2018-12-28 RX ORDER — GLYBURIDE-METFORMIN HYDROCHLORIDE 5; 500 MG/1; MG/1
1 TABLET ORAL 2 TIMES DAILY WITH MEALS
Status: DISCONTINUED | OUTPATIENT
Start: 2018-12-28 | End: 2018-12-28

## 2018-12-28 RX ORDER — GLYBURIDE-METFORMIN HYDROCHLORIDE 5; 500 MG/1; MG/1
1 TABLET ORAL 2 TIMES DAILY WITH MEALS
Status: ON HOLD | COMMUNITY
End: 2019-01-02

## 2018-12-28 RX ORDER — GLYBURIDE 5 MG/1
5 TABLET ORAL 2 TIMES DAILY WITH MEALS
Status: DISCONTINUED | OUTPATIENT
Start: 2018-12-28 | End: 2019-01-03 | Stop reason: HOSPADM

## 2018-12-28 RX ORDER — OLANZAPINE 10 MG/2ML
10 INJECTION, POWDER, FOR SOLUTION INTRAMUSCULAR EVERY 4 HOURS PRN
Status: DISCONTINUED | OUTPATIENT
Start: 2018-12-28 | End: 2019-01-03 | Stop reason: HOSPADM

## 2018-12-28 RX ORDER — ACETAMINOPHEN 325 MG/1
650 TABLET ORAL EVERY 8 HOURS PRN
Status: ON HOLD | COMMUNITY
End: 2019-01-02

## 2018-12-28 RX ORDER — BISACODYL 10 MG
10 SUPPOSITORY, RECTAL RECTAL DAILY PRN
Status: DISCONTINUED | OUTPATIENT
Start: 2018-12-28 | End: 2019-01-03 | Stop reason: HOSPADM

## 2018-12-28 RX ORDER — ALBUTEROL SULFATE 90 UG/1
2 AEROSOL, METERED RESPIRATORY (INHALATION) EVERY 4 HOURS PRN
Status: DISCONTINUED | OUTPATIENT
Start: 2018-12-28 | End: 2019-01-03 | Stop reason: HOSPADM

## 2018-12-28 RX ORDER — ALUMINA, MAGNESIA, AND SIMETHICONE 2400; 2400; 240 MG/30ML; MG/30ML; MG/30ML
30 SUSPENSION ORAL EVERY 4 HOURS PRN
Status: DISCONTINUED | OUTPATIENT
Start: 2018-12-28 | End: 2019-01-03 | Stop reason: HOSPADM

## 2018-12-28 RX ORDER — ACETAMINOPHEN 325 MG/1
650 TABLET ORAL EVERY 4 HOURS PRN
Status: DISCONTINUED | OUTPATIENT
Start: 2018-12-28 | End: 2018-12-29

## 2018-12-28 RX ORDER — HYDROXYZINE PAMOATE 50 MG/1
50 CAPSULE ORAL EVERY 6 HOURS PRN
Status: ON HOLD | COMMUNITY
End: 2019-01-02

## 2018-12-28 RX ORDER — DEXTROSE MONOHYDRATE 25 G/50ML
25-50 INJECTION, SOLUTION INTRAVENOUS
Status: DISCONTINUED | OUTPATIENT
Start: 2018-12-28 | End: 2019-01-03 | Stop reason: HOSPADM

## 2018-12-28 RX ORDER — METOPROLOL TARTRATE 25 MG/1
25 TABLET, FILM COATED ORAL 2 TIMES DAILY
Status: DISCONTINUED | OUTPATIENT
Start: 2018-12-28 | End: 2019-01-03 | Stop reason: HOSPADM

## 2018-12-28 RX ORDER — FAMOTIDINE 20 MG/1
20 TABLET, FILM COATED ORAL 2 TIMES DAILY
Status: DISCONTINUED | OUTPATIENT
Start: 2018-12-28 | End: 2019-01-03 | Stop reason: HOSPADM

## 2018-12-28 RX ORDER — OLANZAPINE 10 MG/1
10 TABLET ORAL EVERY 4 HOURS PRN
Status: DISCONTINUED | OUTPATIENT
Start: 2018-12-28 | End: 2018-12-28 | Stop reason: CLARIF

## 2018-12-28 RX ORDER — GEMFIBROZIL 600 MG/1
600 TABLET, FILM COATED ORAL
Status: DISCONTINUED | OUTPATIENT
Start: 2018-12-29 | End: 2018-12-30

## 2018-12-28 RX ORDER — GEMFIBROZIL 600 MG/1
600 TABLET, FILM COATED ORAL
Status: ON HOLD | COMMUNITY
End: 2019-01-02

## 2018-12-28 RX ORDER — LISINOPRIL 5 MG/1
5 TABLET ORAL DAILY
COMMUNITY

## 2018-12-28 RX ORDER — METOPROLOL TARTRATE 25 MG/1
25 TABLET, FILM COATED ORAL 2 TIMES DAILY
COMMUNITY

## 2018-12-28 RX ORDER — NICOTINE POLACRILEX 4 MG
15-30 LOZENGE BUCCAL
Status: DISCONTINUED | OUTPATIENT
Start: 2018-12-28 | End: 2019-01-03 | Stop reason: HOSPADM

## 2018-12-28 RX ORDER — FAMOTIDINE 20 MG/1
20 TABLET, FILM COATED ORAL 2 TIMES DAILY
COMMUNITY

## 2018-12-28 RX ORDER — OLANZAPINE 10 MG/1
10 TABLET ORAL EVERY 4 HOURS PRN
Status: DISCONTINUED | OUTPATIENT
Start: 2018-12-28 | End: 2019-01-03 | Stop reason: HOSPADM

## 2018-12-28 RX ORDER — TRAZODONE HYDROCHLORIDE 50 MG/1
50 TABLET, FILM COATED ORAL
Status: DISCONTINUED | OUTPATIENT
Start: 2018-12-28 | End: 2019-01-03 | Stop reason: HOSPADM

## 2018-12-28 RX ADMIN — GLYBURIDE 5 MG: 5 TABLET ORAL at 21:12

## 2018-12-28 RX ADMIN — OLANZAPINE 10 MG: 10 TABLET ORAL at 18:15

## 2018-12-28 RX ADMIN — METFORMIN HYDROCHLORIDE 500 MG: 500 TABLET ORAL at 21:12

## 2018-12-28 RX ADMIN — TRAZODONE HYDROCHLORIDE 50 MG: 50 TABLET ORAL at 21:17

## 2018-12-28 RX ADMIN — FAMOTIDINE 20 MG: 20 TABLET ORAL at 21:12

## 2018-12-28 RX ADMIN — METOPROLOL TARTRATE 25 MG: 25 TABLET, FILM COATED ORAL at 21:12

## 2018-12-28 ASSESSMENT — ACTIVITIES OF DAILY LIVING (ADL)
SWALLOWING: 0-->SWALLOWS FOODS/LIQUIDS WITHOUT DIFFICULTY
RETIRED_COMMUNICATION: 0-->UNDERSTANDS/COMMUNICATES WITHOUT DIFFICULTY
DRESS: 0-->INDEPENDENT
FALL_HISTORY_WITHIN_LAST_SIX_MONTHS: NO
RETIRED_EATING: 0-->INDEPENDENT
AMBULATION: 0-->INDEPENDENT
COGNITION: 0 - NO COGNITION ISSUES REPORTED
BATHING: 0-->INDEPENDENT
TOILETING: 0-->INDEPENDENT
TRANSFERRING: 0-->INDEPENDENT

## 2018-12-28 ASSESSMENT — MIFFLIN-ST. JEOR: SCORE: 2008.98

## 2018-12-29 PROCEDURE — 25000132 ZZH RX MED GY IP 250 OP 250 PS 637: Performed by: NURSE PRACTITIONER

## 2018-12-29 PROCEDURE — 99223 1ST HOSP IP/OBS HIGH 75: CPT | Performed by: NURSE PRACTITIONER

## 2018-12-29 PROCEDURE — 12400000 ZZH R&B MH

## 2018-12-29 RX ORDER — HYDROXYZINE PAMOATE 50 MG/1
50 CAPSULE ORAL EVERY 6 HOURS PRN
Status: DISCONTINUED | OUTPATIENT
Start: 2018-12-29 | End: 2019-01-03 | Stop reason: HOSPADM

## 2018-12-29 RX ORDER — BUSPIRONE HYDROCHLORIDE 10 MG/1
10 TABLET ORAL 3 TIMES DAILY
Status: DISCONTINUED | OUTPATIENT
Start: 2018-12-29 | End: 2019-01-03 | Stop reason: HOSPADM

## 2018-12-29 RX ORDER — SUMATRIPTAN 50 MG/1
50 TABLET, FILM COATED ORAL
Status: DISCONTINUED | OUTPATIENT
Start: 2018-12-29 | End: 2019-01-03 | Stop reason: HOSPADM

## 2018-12-29 RX ORDER — GABAPENTIN 600 MG/1
600 TABLET ORAL 3 TIMES DAILY
Status: DISCONTINUED | OUTPATIENT
Start: 2018-12-29 | End: 2019-01-03 | Stop reason: HOSPADM

## 2018-12-29 RX ORDER — ACETAMINOPHEN 325 MG/1
650 TABLET ORAL EVERY 8 HOURS PRN
Status: DISCONTINUED | OUTPATIENT
Start: 2018-12-29 | End: 2019-01-03 | Stop reason: HOSPADM

## 2018-12-29 RX ORDER — QUETIAPINE FUMARATE 200 MG/1
800 TABLET, FILM COATED ORAL AT BEDTIME
Status: DISCONTINUED | OUTPATIENT
Start: 2018-12-29 | End: 2019-01-03 | Stop reason: HOSPADM

## 2018-12-29 RX ORDER — PANTOPRAZOLE SODIUM 20 MG/1
20 TABLET, DELAYED RELEASE ORAL DAILY
Status: DISCONTINUED | OUTPATIENT
Start: 2018-12-29 | End: 2019-01-03 | Stop reason: HOSPADM

## 2018-12-29 RX ORDER — LISINOPRIL 5 MG/1
5 TABLET ORAL DAILY
Status: DISCONTINUED | OUTPATIENT
Start: 2018-12-29 | End: 2019-01-03 | Stop reason: HOSPADM

## 2018-12-29 RX ADMIN — SUMATRIPTAN 50 MG: 50 TABLET, FILM COATED ORAL at 17:21

## 2018-12-29 RX ADMIN — BUSPIRONE HYDROCHLORIDE 10 MG: 10 TABLET ORAL at 20:36

## 2018-12-29 RX ADMIN — GABAPENTIN 600 MG: 600 TABLET, FILM COATED ORAL at 09:50

## 2018-12-29 RX ADMIN — SUMATRIPTAN 50 MG: 50 TABLET, FILM COATED ORAL at 09:52

## 2018-12-29 RX ADMIN — BUSPIRONE HYDROCHLORIDE 10 MG: 10 TABLET ORAL at 09:50

## 2018-12-29 RX ADMIN — LISINOPRIL 5 MG: 5 TABLET ORAL at 09:50

## 2018-12-29 RX ADMIN — HYDROXYZINE HYDROCHLORIDE 50 MG: 25 TABLET ORAL at 09:13

## 2018-12-29 RX ADMIN — OLANZAPINE 10 MG: 10 TABLET ORAL at 09:50

## 2018-12-29 RX ADMIN — METFORMIN HYDROCHLORIDE 500 MG: 500 TABLET ORAL at 08:33

## 2018-12-29 RX ADMIN — FAMOTIDINE 20 MG: 20 TABLET ORAL at 20:36

## 2018-12-29 RX ADMIN — METOPROLOL TARTRATE 25 MG: 25 TABLET, FILM COATED ORAL at 08:37

## 2018-12-29 RX ADMIN — GEMFIBROZIL 600 MG: 600 TABLET ORAL at 16:47

## 2018-12-29 RX ADMIN — SERTRALINE HYDROCHLORIDE 50 MG: 50 TABLET ORAL at 09:50

## 2018-12-29 RX ADMIN — BUSPIRONE HYDROCHLORIDE 10 MG: 10 TABLET ORAL at 13:06

## 2018-12-29 RX ADMIN — METOPROLOL TARTRATE 25 MG: 25 TABLET, FILM COATED ORAL at 20:37

## 2018-12-29 RX ADMIN — PANTOPRAZOLE SODIUM 20 MG: 20 TABLET, DELAYED RELEASE ORAL at 13:06

## 2018-12-29 RX ADMIN — GLYBURIDE 5 MG: 5 TABLET ORAL at 08:38

## 2018-12-29 RX ADMIN — GLYBURIDE 5 MG: 5 TABLET ORAL at 17:20

## 2018-12-29 RX ADMIN — GABAPENTIN 600 MG: 600 TABLET, FILM COATED ORAL at 20:36

## 2018-12-29 RX ADMIN — FAMOTIDINE 20 MG: 20 TABLET ORAL at 08:33

## 2018-12-29 RX ADMIN — GEMFIBROZIL 600 MG: 600 TABLET ORAL at 06:34

## 2018-12-29 RX ADMIN — QUETIAPINE 800 MG: 200 TABLET, FILM COATED ORAL at 20:36

## 2018-12-29 RX ADMIN — ACETAMINOPHEN 650 MG: 325 TABLET, FILM COATED ORAL at 16:47

## 2018-12-29 RX ADMIN — HYDROXYZINE PAMOATE 50 MG: 50 CAPSULE ORAL at 22:31

## 2018-12-29 RX ADMIN — GABAPENTIN 600 MG: 600 TABLET, FILM COATED ORAL at 13:06

## 2018-12-29 ASSESSMENT — ACTIVITIES OF DAILY LIVING (ADL)
HYGIENE/GROOMING: INDEPENDENT
DRESS: SCRUBS (BEHAVIORAL HEALTH);INDEPENDENT
ORAL_HYGIENE: INDEPENDENT
LAUNDRY: UNABLE TO COMPLETE

## 2018-12-29 NOTE — PLAN OF CARE
"  Adult Behavioral Health Plan of Care  Patient-Specific Goal (Individualization)  Description  Patient will maintain daily ADLs without prompting.  Patient will attend >50% of groups while on the unit.   Patient will agree to treatment team recommendations for med changes and aftercare treatment.   If pt states anxiety 1-5 he is to receive 50 mg Atarax, if pt states anxiety 5-10 he is to receive Zyprexa 10 mg per Jocelyne.    12/29/2018 1047 - No Change by Veronica Michelle, RN  Note  Pt was cooperative with nursing assessment and medications.  Pt did not eat breakfast due to headache and anxiety, which he rates 10/10 along with his headache.  He did report a decrease in anxiety after receiving 10 mg of Zyprexa @ 0950 and 50 mg of Atarax @ 0913.  Pt said that he is having visual hallucinations.  That he is seeing \"shadow\" type hallucinations.      Suicide Risk  Absence of Self-Harm  Description  Patient will remain free from self harm while on unit.   Patient will report SI is manageable by discharge.    12/29/2018 1047 - No Change by Veronica Michelle, RN  Note  PT was free from self harm this day shift.       "

## 2018-12-29 NOTE — H&P
"History and Physical    Ar Irby III MRN# [id number]   Age: 38 year old YOB: 1980     Date of Admission:  2018            Chief Complaint:   Chief Complaint: \"I can't live this\"    History is obtained from the patient and EMR         History of Present Illness:        This patient is a 38 year old  male with a significant past psychiatric history of  schizoaffective disorder, bipolar type who presents with suicidal ideation with a plan to stab himself with a knife.Pt was on a provisional discharge which will be revoked.  Arrived here from [facility] in [place], [place].       Pt is on MI commitment through [facility]. He resides in a group home in [place] and reports that he needs to move. His primary reason is that others smoke pot in the house and he is trying to refrain from use. 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 Pt endorses symptoms of increased depression and \"hallucinations\" which he defines as \"seeing shadows\".  Pt goes on to report significant loss over the past several years \"it just really gets to me\".        Pt has attempted suicide 8 times. Once by hanging all other times by overdose. Last attempt was 5-6 months ago. Endorses thoughts of self harm via cutting his throat with a knife.  Pt denies a family history of suicide  Self Injurious Behavior: denies  Homicidal Ideation: denies    Past Psychiatric HX:   Hospitalizations: \"too many for me to remember\".   CD Treatments: a few years ago for ETOH abuse.    Current Stressors: Loneliness, housing, bereavement, and CD issues              Psychiatric Review of Systems:   Current Mood: \"depressed and sad\"    Anxiety: Increased     OCD: none    Panic: none    Phobias - agoraphobia: denies    Yuli - denies racing thoughts, no sleep, pressured speech    PTSD - denies nightmares, flashbacks, intrusive thoughts, emotional numbness    Abuse Hx:pt " "denies physical, emotional, or sexual abuse.    Sexuality: no concerns    Other Addictive Behaviors: denies gambling, [email or web address]    Psychotic Symptoms: endorsing \"shadow\" hallucinations,denies delusions.         Medical Review of Systems:   The 10 point Review of Systems is negative other than noted in the HPI           Psychiatric History:     Extensive history of previous hospitalizations and commitment. Need to obtain records.           Substance Use History:   ETOH has been a significant problem in the past     Has smoked pot in the past, clean for \"3 months\" and wants to stay off.         Past Medical History:   ETOH abuse  Asthma  GERD  Organic Mental Disorder  Hx of Sinus bradycardia       Primary Care Clinic: [facility]  Primary Care Physician: Dr. Vicente Crouch   No History of: hepatitis         Past Surgical History:   This patient has no significant past surgical history           Allergies:      Allergies   Allergen Reactions     Penicillin G Hives     Tuna Flavor Anaphylaxis     Fish-Derived Products      Fish allergy, especially Perch.     No Clinical Screening - See Comments      Tuna and shell fish     Shellfish-Derived Products Itching     Penicillins Rash              Medications:     Medications Prior to Admission   Medication Sig Dispense Refill Last Dose     acetaminophen (TYLENOL) 325 MG tablet Take 650 mg by mouth every 8 hours as needed for mild pain        albuterol (VENTOLIN HFA) 108 (90 Base) MCG/ACT Inhaler Inhale 2 puffs into the lungs every 4 hours as needed for shortness of breath / dyspnea or wheezing Every 4-6 hours as needed 1 Inhaler 0 Past Month at Unknown time     busPIRone (BUSPAR) 10 MG tablet Take 10 mg by mouth every 4 hours as needed        dexlansoprazole (DEXILANT) 30 MG CPDR CR capsule Take 30 mg by mouth daily        famotidine (PEPCID) 20 MG tablet Take 20 mg by mouth 2 times daily        gabapentin (NEURONTIN) 600 MG tablet Take 600 mg by mouth 3 times daily    " "    gemfibrozil (LOPID) 600 MG tablet Take 600 mg by mouth 2 times daily (before meals)        glyBURIDE-metFORMIN (GLUCOVANCE) 5-500 MG tablet Take 1 tablet by mouth 2 times daily (with meals)        hydrOXYzine (VISTARIL) 50 MG capsule Take 50 mg by mouth every 6 hours as needed for itching        lisinopril (PRINIVIL/ZESTRIL) 5 MG tablet Take 5 mg by mouth daily        metoprolol tartrate (LOPRESSOR) 25 MG tablet Take 25 mg by mouth 2 times daily        nicotine polacrilex (NICORETTE) 4 MG gum Place 4 mg inside cheek nightly as needed for smoking cessation        QUEtiapine (SEROQUEL) 400 MG tablet Take 2 tablets (800 mg) by mouth At Bedtime 60 tablet 0      sertraline (ZOLOFT) 50 MG tablet Take 50 mg by mouth daily        SUMAtriptan (IMITREX) 25 MG tablet Take 2 tablets (50 mg) by mouth at onset of headache Max two doses in 24 hour period 14 tablet 0 Past Month at Unknown time              Social History:   [place]   Educational history: High school    Marital history: Engaged once   Children: Yes, would not provide any additional information   Current living situation: Group home in [place]   Occupational history: [employer]         history: None   Father lives in [place].  Mother recently moved, unsure of her location.  1 brother, 1 sister both live in [place]  Reviewed lab, xrays, other tests    Tobacco:3-4 cig day           Family History:   Mental Health: Negative for schizophrenia, bipolar, depression and anxiety. Negative for chemical dependency.   No history of suicides.  Medical: unknown         Labs:    Labs reviewed -negative    /70   Pulse 73   Temp 97.9  F (36.6  C) (Tympanic)   Resp 16   Ht 1.778 m (5' 10\")   Wt 108.8 kg (239 lb 12.8 oz)   SpO2 96%   BMI 34.41 kg/m    Weight is 239 lbs 12.8 oz  Body mass index is 34.41 kg/m .         Psychiatric Examination:   Appearance:  awake, alert and dressed in hospital scrubs  Attitude:  cooperative  Eye Contact:  " fair  Mood:  anxious and sad   Affect:  intensity is blunted  Speech:  mumbling  Psychomotor Behavior:  no evidence of tardive dyskinesia, dystonia, or tics  Thought Process:  goal oriented  Associations:  no loose associations  Thought Content:  passive suicidal ideation present  Insight:  fair  Judgment:  fair  Oriented to:  time, person, and place  Attention Span and Concentration:  fair  Recent and Remote Memory:  fair  Language: Able to name objects, Able to repeat phrases and Able to read and write  Fund of Knowledge: low-normal  Muscle Strength and Tone: normal  Gait and Station: Normal         Physical Exam:    Physical Exam from [facility] reviewed. No new changes.              Diagnoses:   schizoaffective disorder, bipolar type  H/O intellectual disability  TBI  H/o ETOH abuse         Plan:   Admit to Unit: 5th floor [place]  Attending Physician: TOMEKA Ballard,CNP  Patient is: currently under civil commitment  BMP, CBC, and utox are unremarkable  Monitor for target symptoms.   Provide a safe environment and therapeutic milieu.   1. Medications: Recently started on Zoloft for anxiety. Will continue all other medications. Add hydroxyzine 25-50 mg q 6 hours PRN anxiety rated 1-5. For anxiety > 5 give olanzapine 10 mg.  2. Labs/other tests: Vit D3  3. Encouraged group milieu participation/attendance  4.  data: please clarify revocation of provisional discharge   5. Referrals for CD tx, eval , medical referral if needed  6. Education on Dx, medications, treatments (CD or other). For all new medications pt was instructed on the drug, dose, route, action, and potential side effects. Pt verbalized understanding.    Attestation:  Patient has been seen and evaluated by me,  TOMEKA Ballard CNP

## 2018-12-29 NOTE — PROGRESS NOTES
12/28/18 1758   Patient Belongings   Did you bring any home meds/supplements to the hospital?  No   Patient Belongings locker;remains with patient;sent to security per site process   Patient Belongings Remaining with Patient bracelet;earrings;glasses;shoes   Patient Belongings Put in Hospital Secure Location (Security or Locker, etc.) clothing;cash/credit card;cell phone/electronics;necklace;keys;jewelry;Roman [employer] item;other (see comments)   Belongings Search Yes   Clothing Search Yes   Second Staff Mikki UA   Comment glasses, one earring in left ear, white & blue air linnette shoes, red bracelet, blue bracelet, black headphones, brown belt, 2 blue lighters, motorola black , white phone , red shirt, blue lanyard, skullcandy red headphones, black jeans, white lighter, misc papers, misc cards (rewards, business, etc), blue shirt, gray & black sweatshirt, black jacket, white shoelaces, gray socks, black hat, navy blue shorts   List items sent to safe: green lanyard with one key, 2 master debit cards, member credit union card, walmart gift card, 2 [place] Identification cards, 2 best western rewards cards, silver sneakers card, black fob, MHCP card, silver colored necklace with jewels, silver colored eagle necklace, silver & copper colored cross piece, ZTE black phone with no cracks, Motorola phone with no cracks, silver colored necklace with silver & copper colored cross, silver colored necklace with black & silver colored cross piece.    All other belongings put in assigned cubby in belongings room.     I have reviewed my belongings list on admission and verify that it is correct.     Patient signature_______________________________    Second staff witness (if patient unable to sign) ______________________________       I have received all my belongings at discharge.    Patient signature________________________________    [facility]  12/28/2018  6:57 PM

## 2018-12-29 NOTE — PLAN OF CARE
"ADMISSION NOTE    Reason for admission: suicidal ideation .  Safety concerns: history of impulsive behavior.  Risk for or history of violence: none known.   Full skin assessment: completed, patient has no open areas. He has no tattoos.     Patient arrived on unit from [facility] accompanied by transport and :security on 12/28/2018   PM.   Status on arrival: anxious, cooperative, disheveled, trouble following directions.   /75   Pulse 78   Temp 97.9  F (36.6  C) (Tympanic)   Resp 16   Ht 1.778 m (5' 10\")   Wt 108.8 kg (239 lb 12.8 oz)   SpO2 95%   BMI 34.41 kg/m    Patient given tour of unit and Welcome to  unit papers given to patient, wanding completed, belongings inventoried, and admission assessment completed.   Patient's legal status on arrival is committed through [facility]. Appropriate legal rights discussed with and copy given to patient. Patient Bill of Rights discussed with and copy given to patient.   Patient denies SI, HI, and thoughts of self harm and contracts for safety while on unit.      Jacquelyn Diego  12/28/2018  7:59 PM          "

## 2018-12-29 NOTE — PLAN OF CARE
"ADMISSION NOTE     Reason for admission: suicidal ideation         .  Safety concerns: history of impulsive behavior.  Risk for or history of violence: none known.   Full skin assessment: completed, patient has no open areas. He has no tattoos.      Patient arrived on unit from [facility] ED accompanied by transport and :security on 12/28/2018   PM.   Status on arrival: anxious, cooperative, disheveled, trouble following directions.   /75   Pulse 78   Temp 97.9  F (36.6  C) (Tympanic)   Resp 16   Ht 1.778 m (5' 10\")   Wt 108.8 kg (239 lb 12.8 oz)   SpO2 95%   BMI 34.41 kg/m    Patient given tour of unit and Welcome to  unit papers given to patient, wanding completed, belongings inventoried, and admission assessment completed.   Patient's legal status on arrival is committed through [facility]. Appropriate legal rights discussed with and copy given to patient. Patient Bill of Rights discussed with and copy given to patient.   Patient denies SI, HI, and thoughts of self harm and contracts for safety while on unit.         Patient admitted to the unit at 17:05. He was disheveled and odorous. Patient was cooperative but tense and anxious. He allowed a skin assessment but needed multiple prompts and had trouble following directions. Patient reported he was feeling suicidal and reported this to his house staff. [facility] His staff has been very helpful in faxing his meds and supportive of patient's needs. Jody at facility said they look forward to patient returning. Staff reported that patient had been taking all of his meds as scheduled. Patient reports he has sleep apnea and has a CPAP at home. Patient appeared to be slow to respond at times and seemed to be responding to internal stimuli. He denied HI/SI and hallucinations at that time.         Adult Behavioral Health Plan of Care  Patient-Specific Goal " (Individualization)  Description  Patient will maintain daily ADLs without prompting.  Patient will attend >50% of groups while on the unit.   Patient will agree to treatment team recommendations for med changes and aftercare treatment.      12/28/2018 2030 - No Change by Jacquelyn Diego RN     Suicide Risk  Absence of Self-Harm  Description  Patient will remain free from self harm while on unit.   Patient will report SI is manageable by discharge.    12/28/2018 2030 - No Change by Jacquelyn Diego RN

## 2018-12-29 NOTE — PLAN OF CARE
Face to face given by WERNER William.  Pt was asleep in bed with bilat chest rise.    Veronica Michelle RN  0726

## 2018-12-29 NOTE — PLAN OF CARE
Adult Behavioral Health Plan of Care  Patient-Specific Goal (Individualization)  Description  Patient will maintain daily ADLs without prompting.  Patient will attend >50% of groups while on the unit.   Patient will agree to treatment team recommendations for med changes and aftercare treatment.      12/29/2018 0217 by Nataliia Garcia, RN  Note  0045 has been sitting and sleeping intermittently in the lobby. Has been asked on several occasions if he needed anything and he declines. 0230 Patient went to bed and sleep. 0500 patient continues to sleep at this time.0630 patient had to be awoke for vitals and early med. He returned to sleep on his bed.     Suicide Risk  Absence of Self-Harm  Description  Patient will remain free from self harm while on unit.   Patient will report SI is manageable by discharge.    12/29/2018 0217 by Nataliia Garcia, RN  Note  0045 in lobby sleeping intermittently this is his choice. He has no needs.

## 2018-12-29 NOTE — PLAN OF CARE
Face to face end of shift report received from Veronica OMON RN. Rounding completed. Patient observed in the lounge asleep in a chair.     Jacquelyn Diego  12/29/2018  4:26 PM

## 2018-12-29 NOTE — PROGRESS NOTES
Face to face end of shift report received from petty rn at 2300 report.. Rounding completed. Patient observed.     Nataliia Garcia  12/29/2018  2:19 AM

## 2018-12-30 PROCEDURE — 99232 SBSQ HOSP IP/OBS MODERATE 35: CPT | Performed by: NURSE PRACTITIONER

## 2018-12-30 PROCEDURE — 25000132 ZZH RX MED GY IP 250 OP 250 PS 637: Performed by: NURSE PRACTITIONER

## 2018-12-30 PROCEDURE — 12400000 ZZH R&B MH

## 2018-12-30 RX ORDER — GEMFIBROZIL 600 MG/1
600 TABLET, FILM COATED ORAL
Status: DISCONTINUED | OUTPATIENT
Start: 2018-12-30 | End: 2019-01-03 | Stop reason: HOSPADM

## 2018-12-30 RX ADMIN — METFORMIN HYDROCHLORIDE 500 MG: 500 TABLET ORAL at 08:22

## 2018-12-30 RX ADMIN — GABAPENTIN 600 MG: 600 TABLET, FILM COATED ORAL at 08:21

## 2018-12-30 RX ADMIN — METOPROLOL TARTRATE 25 MG: 25 TABLET, FILM COATED ORAL at 20:12

## 2018-12-30 RX ADMIN — PANTOPRAZOLE SODIUM 20 MG: 20 TABLET, DELAYED RELEASE ORAL at 08:21

## 2018-12-30 RX ADMIN — HYDROXYZINE PAMOATE 50 MG: 50 CAPSULE ORAL at 17:00

## 2018-12-30 RX ADMIN — METOPROLOL TARTRATE 25 MG: 25 TABLET, FILM COATED ORAL at 08:21

## 2018-12-30 RX ADMIN — FAMOTIDINE 20 MG: 20 TABLET ORAL at 08:22

## 2018-12-30 RX ADMIN — GABAPENTIN 600 MG: 600 TABLET, FILM COATED ORAL at 20:10

## 2018-12-30 RX ADMIN — BUSPIRONE HYDROCHLORIDE 10 MG: 10 TABLET ORAL at 20:10

## 2018-12-30 RX ADMIN — SUMATRIPTAN 50 MG: 50 TABLET, FILM COATED ORAL at 20:12

## 2018-12-30 RX ADMIN — SERTRALINE HYDROCHLORIDE 50 MG: 50 TABLET ORAL at 08:23

## 2018-12-30 RX ADMIN — GABAPENTIN 600 MG: 600 TABLET, FILM COATED ORAL at 13:30

## 2018-12-30 RX ADMIN — GLYBURIDE 5 MG: 5 TABLET ORAL at 09:27

## 2018-12-30 RX ADMIN — BUSPIRONE HYDROCHLORIDE 10 MG: 10 TABLET ORAL at 08:22

## 2018-12-30 RX ADMIN — LISINOPRIL 5 MG: 5 TABLET ORAL at 08:21

## 2018-12-30 RX ADMIN — QUETIAPINE 800 MG: 200 TABLET, FILM COATED ORAL at 20:10

## 2018-12-30 RX ADMIN — FAMOTIDINE 20 MG: 20 TABLET ORAL at 20:10

## 2018-12-30 RX ADMIN — GEMFIBROZIL 600 MG: 600 TABLET ORAL at 17:00

## 2018-12-30 RX ADMIN — BUSPIRONE HYDROCHLORIDE 10 MG: 10 TABLET ORAL at 13:30

## 2018-12-30 RX ADMIN — SUMATRIPTAN 50 MG: 50 TABLET, FILM COATED ORAL at 13:57

## 2018-12-30 ASSESSMENT — ACTIVITIES OF DAILY LIVING (ADL)
LAUNDRY: UNABLE TO COMPLETE
ORAL_HYGIENE: INDEPENDENT
HYGIENE/GROOMING: INDEPENDENT
DRESS: SCRUBS (BEHAVIORAL HEALTH);INDEPENDENT

## 2018-12-30 ASSESSMENT — MIFFLIN-ST. JEOR: SCORE: 2027.57

## 2018-12-30 NOTE — PLAN OF CARE
Adult Behavioral Health Plan of Care  Patient-Specific Goal (Individualization)  Description  Patient will maintain daily ADLs without prompting.  Patient will attend >50% of groups while on the unit.   Patient will agree to treatment team recommendations for med changes and aftercare treatment.   If pt states anxiety 1-5 he is to receive 50 mg Atarax, if pt states anxiety 5-10 he is to receive Zyprexa 10 mg per Jocelyne.    12/30/2018 1009 - No Change by Veroinca Michelle, RN  Note  Pt was cooperative with nursing assessment and medications.  He ate well, talked to provider, took medications, and went back to bed.  Pt ate lunch and then showered.        Suicide Risk  Absence of Self-Harm  Description  Patient will remain free from self harm while on unit.   Patient will report SI is manageable by discharge.    12/30/2018 1009 by Veronica Michelle, RN  Note  Pt did not harm self this day shift.

## 2018-12-30 NOTE — PLAN OF CARE
Adult Behavioral Health Plan of Care  Patient-Specific Goal (Individualization)  Description  Patient will maintain daily ADLs without prompting.  Patient will attend >50% of groups while on the unit.   Patient will agree to treatment team recommendations for med changes and aftercare treatment.   If pt states anxiety 1-5 he is to receive 50 mg Atarax, if pt states anxiety 5-10 he is to receive Zyprexa 10 mg per Jocelyne.    12/30/2018 0029 by Nataliia Garcia RN  Note  0015 patient up in the lobby having a snack, he is dozing in the lobby and he is aware that he has a cpap when he is ready to go to sleep. He is steady on feet. No requests. 0100 patient is sleeping in the chair in the lobby. 0200 patient ready to go to lay down in bed. His current room doesn't have plug so moved with patients approval to room 552-2. Respiratory is going to finish setting up the cpap and the one to one staff begins.   0215 finished setting up cpap and patient was updated on the morning activities and he doesn't want his lopid early and he doesn't want to be woken for early morning vitals. sticki notes tagged. 0530 patient continues to sleep with cpap and one to one staff for safety.at 0200 patient said he did not want his lopid early but wants it with his am meds, and he doesn't want  To be awoken for early vitals.     Suicide Risk  Absence of Self-Harm  Description  Patient will remain free from self harm while on unit.   Patient will report SI is manageable by discharge.    12/30/2018 0029 by Nataliia Garcia, RN  Note  0015 in [facility] had a snack and dozing, has no complaints of suiciality.

## 2018-12-30 NOTE — PLAN OF CARE
Face to face end of shift report received from Veronica MOON RN. Rounding completed. Patient observed sitting in the lounge.     Jacquelyn Diego  12/30/2018  3:56 PM

## 2018-12-30 NOTE — PLAN OF CARE
Face to face end of shift report received from WERNER William. Rounding completed. Patient observed in bed sleeping with bilat chest rise.     Veronica Michelle  12/30/2018  7:51 AM

## 2018-12-30 NOTE — PROGRESS NOTES
Face to face end of shift report received from petty rn at 2300 report.. Rounding completed. Patient observed.     Nataliia Garcia  12/30/2018  1:02 AM

## 2018-12-30 NOTE — PLAN OF CARE
"Patient endorsed both anxiety and depression. He also endorsed tactile hallucinations and he described it as \"feeling like something's brushing up against my shoulder.\" Patient showed this writer what he meant. He denied HI/SI. Patient was much more relaxed and at times he smiled. He was cooperative and able to track in conversation better. Patient seemed somewhat confused at times but after asking for clarification and slowing the patient down, he was much more clear. Patient complained of pain that he rated 9 of 10 and received 650mg tylenol at 16:47. He did not received any relief from this and was given a dose of Imitrex at 17:21. This is the second dose of the medication in a 24 hour period. Patient reported much better relief after this. Patient attended groups and participated in unit milieu. He is disheveled. Patient did complain of anxiety at the start of the shift. It was too soon to give a PRN and patient was fine with this information.    22:31 Update: Patient reported he was having anxiety. This writer asked him to describe what was going on and he said \"8.\" This writer talked to him more and he agreed to try the 50mg Vistaril. When he saw the pill, he said \"that's the one I take at home.\" He said he likes it and it helps. Patient is understanding he will have the CPAP at 11:30PM in his room. He is agreeable to this.     Adult Behavioral Health Plan of Care  Patient-Specific Goal (Individualization)  Description  Patient will maintain daily ADLs without prompting.  Patient will attend >50% of groups while on the unit.   Patient will agree to treatment team recommendations for med changes and aftercare treatment.   If pt states anxiety 1-5 he is to receive 50 mg Atarax, if pt states anxiety 5-10 he is to receive Zyprexa 10 mg per Jocelyne.    12/29/2018 2154 - Improving by Jacquelyn Diego, RN     Suicide Risk  Absence of Self-Harm  Description  Patient will remain free from self harm while on unit. "   Patient will report SI is manageable by discharge.    12/29/2018 2154 - Improving by Jacquelyn Diego RN

## 2018-12-30 NOTE — PROGRESS NOTES
[facility]  Psychiatric Progress Note    Subjective   This is a 38 year old male with schizoaffective disorder, bipolar type, H/O intellectual disability, TBI, and H/O ETOH abuse. Pt did wear a CPAP last night, we discussed the need for 1:1 staffing when this is on. He did have it taken off at 0730 and tolerated well. Pt continues to express concerns over his current group home placement, his goal is to return to the [facility]. Pt continues to experience increased anxiety related to the death of his finance and other losses within his family. Denies AH/VH, Denies SI/HI.    10 point ROS negative other than what is noted in HPI       DIagnoses:    schizoaffective disorder, bipolar type  H/O intellectual disability  TIB  H/O ETOH abuse      Attestation:  Patient has been seen and evaluated by me,  TOMEKA Ballard CNP          Interim History:   The patient's care was discussed with the treatment team and chart notes were reviewed.          Medications:       busPIRone  10 mg Oral TID     famotidine  20 mg Oral BID     gabapentin  600 mg Oral TID     gemfibrozil  600 mg Oral BID AC     glyBURIDE  5 mg Oral BID w/meals    And     metFORMIN  500 mg Oral BID w/meals     lisinopril  5 mg Oral Daily     metoprolol tartrate  25 mg Oral BID     pantoprazole  20 mg Oral Daily     QUEtiapine  800 mg Oral At Bedtime     sertraline  50 mg Oral Daily     acetaminophen, albuterol, alum & mag hydroxide-simethicone, bisacodyl, glucose **OR** dextrose **OR** glucagon, hydrOXYzine, magnesium hydroxide, nicotine, OLANZapine **OR** OLANZapine, SUMAtriptan, traZODone          Allergies:     Allergies   Allergen Reactions     Penicillin G Hives     Tuna Flavor Anaphylaxis     Fish-Derived Products      Fish allergy, especially Perch.     No Clinical Screening - See Comments      Tuna and shell fish     Shellfish-Derived Products Itching     Penicillins Rash            Psychiatric Examination:   BP (!) 112/6    "Pulse 75   Temp 97  F (36.1  C) (Tympanic)   Resp 16   Ht 1.778 m (5' 10\")   Wt 110.6 kg (243 lb 14.4 oz)   SpO2 95%   BMI 35.00 kg/m    Weight is 243 lbs 14.4 oz  Body mass index is 35 kg/m .    Appearance:  awake, alert  Attitude:  cooperative  Eye Contact:  fair  Mood:  anxious  Affect:  mood congruent  Speech:  clear, coherent  Psychomotor Behavior:  no evidence of tardive dyskinesia, dystonia, or tics and intact station, gait and muscle tone  Thought Process:  goal oriented  Associations:  no loose associations  Thought Content:  no evidence of suicidal ideation or homicidal ideation and no evidence of psychotic thought  Insight:  fair  Judgment:  fair  Oriented to:  time, person, and place  Attention Span and Concentration:  intact  Recent and Remote Memory:  fair  Fund of Knowledge: low-normal  Muscle Strength and Tone: normal  Gait and Station: Normal  Perception: no perceptual disorder noted         Labs:   No results found for this or any previous visit (from the past 24 hour(s)).          Assessment/ Plan:    Continue on current medications. Pt reminded to rate his level of anxiety.    "

## 2018-12-31 PROCEDURE — 25000132 ZZH RX MED GY IP 250 OP 250 PS 637: Performed by: NURSE PRACTITIONER

## 2018-12-31 PROCEDURE — 12400000 ZZH R&B MH

## 2018-12-31 RX ADMIN — BUSPIRONE HYDROCHLORIDE 10 MG: 10 TABLET ORAL at 20:31

## 2018-12-31 RX ADMIN — HYDROXYZINE PAMOATE 50 MG: 50 CAPSULE ORAL at 01:14

## 2018-12-31 RX ADMIN — ALUMINUM HYDROXIDE, MAGNESIUM HYDROXIDE, AND DIMETHICONE 30 ML: 400; 400; 40 SUSPENSION ORAL at 01:14

## 2018-12-31 RX ADMIN — FAMOTIDINE 20 MG: 20 TABLET ORAL at 08:59

## 2018-12-31 RX ADMIN — GEMFIBROZIL 600 MG: 600 TABLET ORAL at 16:39

## 2018-12-31 RX ADMIN — LISINOPRIL 5 MG: 5 TABLET ORAL at 08:58

## 2018-12-31 RX ADMIN — GABAPENTIN 600 MG: 600 TABLET, FILM COATED ORAL at 14:15

## 2018-12-31 RX ADMIN — BUSPIRONE HYDROCHLORIDE 10 MG: 10 TABLET ORAL at 08:58

## 2018-12-31 RX ADMIN — METOPROLOL TARTRATE 25 MG: 25 TABLET, FILM COATED ORAL at 08:59

## 2018-12-31 RX ADMIN — FAMOTIDINE 20 MG: 20 TABLET ORAL at 20:31

## 2018-12-31 RX ADMIN — GABAPENTIN 600 MG: 600 TABLET, FILM COATED ORAL at 20:31

## 2018-12-31 RX ADMIN — PANTOPRAZOLE SODIUM 20 MG: 20 TABLET, DELAYED RELEASE ORAL at 08:59

## 2018-12-31 RX ADMIN — GLYBURIDE 5 MG: 5 TABLET ORAL at 08:58

## 2018-12-31 RX ADMIN — BUSPIRONE HYDROCHLORIDE 10 MG: 10 TABLET ORAL at 14:15

## 2018-12-31 RX ADMIN — SERTRALINE HYDROCHLORIDE 50 MG: 50 TABLET ORAL at 08:58

## 2018-12-31 RX ADMIN — METOPROLOL TARTRATE 25 MG: 25 TABLET, FILM COATED ORAL at 21:11

## 2018-12-31 RX ADMIN — QUETIAPINE 800 MG: 200 TABLET, FILM COATED ORAL at 20:31

## 2018-12-31 RX ADMIN — OLANZAPINE 10 MG: 10 TABLET ORAL at 11:26

## 2018-12-31 RX ADMIN — GABAPENTIN 600 MG: 600 TABLET, FILM COATED ORAL at 08:58

## 2018-12-31 RX ADMIN — GEMFIBROZIL 600 MG: 600 TABLET ORAL at 08:58

## 2018-12-31 ASSESSMENT — ACTIVITIES OF DAILY LIVING (ADL)
ORAL_HYGIENE: INDEPENDENT
HYGIENE/GROOMING: INDEPENDENT
DRESS: SCRUBS (BEHAVIORAL HEALTH)
DRESS: SCRUBS (BEHAVIORAL HEALTH);INDEPENDENT
HYGIENE/GROOMING: INDEPENDENT
LAUNDRY: UNABLE TO COMPLETE
ORAL_HYGIENE: INDEPENDENT
LAUNDRY: UNABLE TO COMPLETE

## 2018-12-31 NOTE — PLAN OF CARE
0114 pt sitting in the lounge he states he has a stomach ache, he believes s/t his metformin, he states he often gets a stomach ache after taking this medication. Pt does ask for a sandwich he is informed we will be getting a sandwich. He does state he believed the aid this writer sent to get the sandwich was going to spit in his sandwich. Nurse does ensure patient the UA would not spit in the sandwich.   Pt asks for a PRN for anxiety 9/10 and maalox.   0114 nurse administered Maalox and Vistaril 50 mg p.o. PRN. Per patients request. Pt was offered Zyprexa but declines at this time stating a preferance of Vistaril.

## 2018-12-31 NOTE — PROGRESS NOTES
Face to face end of shift report received from petty rn at 2300 report.. Rounding completed. Patient observed.     Nataliia Garcia  12/31/2018  12:54 AM

## 2018-12-31 NOTE — PLAN OF CARE
Face to face end of shift report received from WERNER De Los Santos. Rounding completed. Patient observed in [facility].      Veronica Michelle  12/31/2018  4:21 PM

## 2018-12-31 NOTE — PLAN OF CARE
Pt is worried about being revoked by his CM. Talked to pt about this and let him know that I am not sure if it is going to happen and that I haven't heard anything about it at this point. Informed him writer has attempted to contact CM and she is out of the office.     Will attempt to contact CM again Wednesday.
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
## 2018-12-31 NOTE — PLAN OF CARE
Contacted [facility] to see about renovation status on pt per NP intake note.   Was informed by Devi Harris pt is actually committed through [facility] but [facility] is [facility] of financial responsibility, CM is Sakshi Red.   Attempted to contact CM - left her a message.     Sakshi Red [phone] Ext. [phone]

## 2018-12-31 NOTE — PLAN OF CARE
Patient reported both anxiety and depression. He denied HI/SI. He endorsed some visual hallucinations. Patient was talkative and at times did get defensive in conversation. He has trouble communicating in a way that allows people to understand his thoughts. When bringing this up to him and asking for clarification, he became frustrated. Patient is disheveled looking. He denied pain at this time. He did not attend groups but sits in the lounge with peers. Patient requested something for anxiety rated 5 of 10 and received Vistaril at 17:00. He refused his metformin and glyburide. He said it doesn't help him.     20:15 Update: Patient rated his pain at 9 of 10 and asked for Imitrix for this. He described it as a head. He also seemed somewhat confused. Patient has been sleeping off and on sitting in the lounge.          Suicide Risk  Absence of Self-Harm  Description  Patient will remain free from self harm while on unit.   Patient will report SI is manageable by discharge.    12/30/2018 1819 - No Change by Jacquelyn Diego, RN     Adult Behavioral Health Plan of Care  Patient-Specific Goal (Individualization)  Description  Patient will maintain daily ADLs without prompting.  Patient will attend >50% of groups while on the unit.   Patient will agree to treatment team recommendations for med changes and aftercare treatment.   If pt states anxiety 1-5 he is to receive 50 mg Atarax, if pt states anxiety 5-10 he is to receive Zyprexa 10 mg per Jocelyne.    12/30/2018 1819 - No Change by Jacquelyn Diego, RN

## 2018-12-31 NOTE — PLAN OF CARE
BEHAVIORAL TEAM DISCUSSION    Participants: Kiel Blair NP, Misty Caballero [facility], Sanjana Rubio [facility], Sana Baird RN, Veronica Blanton RN, Dipti Mars RN, Zeenat Britton RN, Jackelin Wynn OT  Progress: new admit  Continued Stay Criteria/Rationale:monitor for medication side effects and effectiveness   Medical/Physical: TBI  Precautions:   Behavioral Orders   Procedures    Code 1 - Restrict to Unit    Routine Programming     As clinically indicated    Self Injury Precaution    Status 15     Every 15 minutes.     Plan: committed, likely will be revoked  Rationale for change in precautions or plan: none    Patient Active Problem List   Diagnosis    Suicidal ideation    Closed fracture of ankle    Asthma    Bipolar 1 disorder (H)    Central sleep apnea    Chronic mental illness    Dyslipidemia    Gastroesophageal reflux    Insomnia    Intellectual disability    JANIE on CPAP    Schizoaffective disorder (H)    Diabetes mellitus, type 2 (H)    Suicidal intent    Depression with suicidal ideation       Current Facility-Administered Medications:     acetaminophen (TYLENOL) tablet 650 mg, 650 mg, Oral, Q8H PRN, Jocelyne Barton APRN CNP, 650 mg at 12/29/18 1647    albuterol (PROAIR HFA/PROVENTIL HFA/VENTOLIN HFA) 108 (90 Base) MCG/ACT inhaler 2 puff, 2 puff, Inhalation, Q4H PRN, Elena Fajardo NP    alum & mag hydroxide-simethicone (MYLANTA ES/MAALOX  ES) suspension 30 mL, 30 mL, Oral, Q4H PRN, Jody Sheets Mc, APRN CNP, 30 mL at 12/31/18 0114    bisacodyl (DULCOLAX) Suppository 10 mg, 10 mg, Rectal, Daily PRN, Jody Sheets Mc, APRN CNP    busPIRone (BUSPAR) tablet 10 mg, 10 mg, Oral, TID, Jocelyne Barton APRN CNP, 10 mg at 12/31/18 0858    glucose gel 15-30 g, 15-30 g, Oral, Q15 Min PRN **OR** dextrose 50 % injection 25-50 mL, 25-50 mL, Intravenous, Q15 Min PRN **OR** glucagon injection 1 mg, 1 mg, Subcutaneous, Q15 Min PRN, Elena Fajardo, NP    famotidine (PEPCID) tablet 20 mg, 20 mg, Oral, BID, Elena Fajardo, NP,  20 mg at 12/31/18 0859    gabapentin (NEURONTIN) tablet 600 mg, 600 mg, Oral, TID, Jocelyne Barton APRN CNP, 600 mg at 12/31/18 0858    gemfibrozil (LOPID) tablet 600 mg, 600 mg, Oral, BID AC, Jocelyne Barton APRN CNP, 600 mg at 12/31/18 0858    glyBURIDE (DIABETA /MICRONASE) tablet 5 mg, 5 mg, Oral, BID w/meals, 5 mg at 12/31/18 0858 **AND** metFORMIN (GLUCOPHAGE) tablet 500 mg, 500 mg, Oral, BID w/meals, Elena Fajardo, NP, 500 mg at 12/30/18 0822    hydrOXYzine (VISTARIL) capsule 50 mg, 50 mg, Oral, Q6H PRN, Jocelyne Barton APRN CNP, 50 mg at 12/31/18 0114    lisinopril (PRINIVIL/ZESTRIL) tablet 5 mg, 5 mg, Oral, Daily, Jocelyne Barton APRN CNP, 5 mg at 12/31/18 0858    magnesium hydroxide (MILK OF MAGNESIA) suspension 30 mL, 30 mL, Oral, At Bedtime PRN, Jody Sheets Mc, APRN CNP    metoprolol tartrate (LOPRESSOR) tablet 25 mg, 25 mg, Oral, BID, Elena Fajardo NP, 25 mg at 12/31/18 0859    nicotine (NICORETTE) gum 2-4 mg, 2-4 mg, Buccal, Q2H PRN, Jody Sheets Mc, APRN CNP    OLANZapine (zyPREXA) tablet 10 mg, 10 mg, Oral, Q4H PRN, 10 mg at 12/29/18 0950 **OR** OLANZapine (zyPREXA) injection 10 mg, 10 mg, Intramuscular, Q4H PRN, Elena Fajardo NP    pantoprazole (PROTONIX) EC tablet 20 mg, 20 mg, Oral, Daily, Jocelyne Barton APRN CNP, 20 mg at 12/31/18 0859    QUEtiapine (SEROquel) tablet 800 mg, 800 mg, Oral, At Bedtime, Jocelyne Barton APRN CNP, 800 mg at 12/30/18 2010    sertraline (ZOLOFT) tablet 50 mg, 50 mg, Oral, Daily, Jocelyne Barton APRN CNP, 50 mg at 12/31/18 0858    SUMAtriptan (IMITREX) tablet 50 mg, 50 mg, Oral, at onset of headache, Jocelyne Barton APRN CNP, 50 mg at 12/30/18 2012    traZODone (DESYREL) tablet 50 mg, 50 mg, Oral, At Bedtime PRN, Jody Sheets Mc, TOMEKA CNP, 50 mg at 12/28/18 2117

## 2018-12-31 NOTE — PLAN OF CARE
Adult Behavioral Health Plan of Care  Patient-Specific Goal (Individualization)  Description  Patient will maintain daily ADLs without prompting.  Patient will attend >50% of groups while on the unit.   Patient will agree to treatment team recommendations for med changes and aftercare treatment.   If pt states anxiety 1-5 he is to receive 50 mg Atarax, if pt states anxiety 5-10 he is to receive Zyprexa 10 mg per Jocelyne.    12/31/2018 0052 by Nataliia Garcia, RN  Note  0015 in [facility] . He is aware of the staff and the cpap is available. 0114 patient woke up . Checked with patient and he wants the lopid with his other am meds, he is aware that the cpap will be come off at or before if he is up at 0730, and he said we could wake him for early vitals. He did request and given a snack and he wanted and we were able to get him  Egg salad  [place]. He also asked for and given by nurse his prn vistaril (thats what he had asked -he was given the option of vistaril or zyprexa), and he accepted his prn maalox. See mar. He is talking with nurse quite a bit.    0130 patient settled to bed with cpap and larger mask that he asked for prior to this shift, and one to one staff present for safety. 0500 patient continues to sleep at this time, one to one staff present for safety with cpap  Suicide Risk  Absence of Self-Harm  Description  Patient will remain free from self harm while on unit.   Patient will report SI is manageable by discharge.    12/31/2018 0052 by Nataliia Garcia, RN  Note  0015 no complaints of suicidal ideation.

## 2018-12-31 NOTE — PLAN OF CARE
Adult Behavioral Health Plan of Care  Patient-Specific Goal (Individualization)  Description  Patient will maintain daily ADLs without prompting.  Patient will attend >50% of groups while on the unit.   Patient will agree to treatment team recommendations for med changes and aftercare treatment.   If pt states anxiety 1-5 he is to receive 50 mg Atarax, if pt states anxiety 5-10 he is to receive Zyprexa 10 mg per Jocelyne.    12/31/2018 1012 - No Change by Magali Howard, RN  Note  Patient reports both anxiety and depression. He denied HI/SI.  Denies pain at this time. He did not attend group. Metformin refused r/t upset stomach.  Pacing the sams waiting to talk to .  1126-Zyprexa 10 mg admin for anxiety 9/10 on 1-10 scale.   1300-states anxiety is still a 9/10 on 1-10 scale, r/t guardian and uncertainty of current situation.          Suicide Risk  Absence of Self-Harm  Description  Patient will remain free from self harm while on unit.   Patient will report SI is manageable by discharge.    Denies SI.      12/31/2018 1012 - No Change by Magali Howard, RN

## 2018-12-31 NOTE — PLAN OF CARE
Face to face end of shift report received from Tiana MOON RN. Rounding completed. Patient observed sleeping in bed.    Magali Howard  12/31/2018  7:51 AM

## 2019-01-01 PROCEDURE — 25000132 ZZH RX MED GY IP 250 OP 250 PS 637: Performed by: NURSE PRACTITIONER

## 2019-01-01 PROCEDURE — 99232 SBSQ HOSP IP/OBS MODERATE 35: CPT | Performed by: NURSE PRACTITIONER

## 2019-01-01 PROCEDURE — 12400000 ZZH R&B MH

## 2019-01-01 RX ADMIN — SUMATRIPTAN 50 MG: 50 TABLET, FILM COATED ORAL at 11:45

## 2019-01-01 RX ADMIN — BUSPIRONE HYDROCHLORIDE 10 MG: 10 TABLET ORAL at 08:11

## 2019-01-01 RX ADMIN — GLYBURIDE 5 MG: 5 TABLET ORAL at 16:10

## 2019-01-01 RX ADMIN — PANTOPRAZOLE SODIUM 20 MG: 20 TABLET, DELAYED RELEASE ORAL at 08:12

## 2019-01-01 RX ADMIN — GABAPENTIN 600 MG: 600 TABLET, FILM COATED ORAL at 21:27

## 2019-01-01 RX ADMIN — METOPROLOL TARTRATE 25 MG: 25 TABLET, FILM COATED ORAL at 08:12

## 2019-01-01 RX ADMIN — ACETAMINOPHEN 650 MG: 325 TABLET, FILM COATED ORAL at 05:13

## 2019-01-01 RX ADMIN — LISINOPRIL 5 MG: 5 TABLET ORAL at 08:12

## 2019-01-01 RX ADMIN — GABAPENTIN 600 MG: 600 TABLET, FILM COATED ORAL at 13:12

## 2019-01-01 RX ADMIN — GEMFIBROZIL 600 MG: 600 TABLET ORAL at 16:10

## 2019-01-01 RX ADMIN — METFORMIN HYDROCHLORIDE 500 MG: 500 TABLET ORAL at 08:12

## 2019-01-01 RX ADMIN — METOPROLOL TARTRATE 25 MG: 25 TABLET, FILM COATED ORAL at 21:26

## 2019-01-01 RX ADMIN — BUSPIRONE HYDROCHLORIDE 10 MG: 10 TABLET ORAL at 21:27

## 2019-01-01 RX ADMIN — FAMOTIDINE 20 MG: 20 TABLET ORAL at 08:12

## 2019-01-01 RX ADMIN — GABAPENTIN 600 MG: 600 TABLET, FILM COATED ORAL at 08:12

## 2019-01-01 RX ADMIN — BUSPIRONE HYDROCHLORIDE 10 MG: 10 TABLET ORAL at 13:12

## 2019-01-01 RX ADMIN — QUETIAPINE 800 MG: 200 TABLET, FILM COATED ORAL at 21:26

## 2019-01-01 RX ADMIN — OLANZAPINE 10 MG: 10 TABLET ORAL at 11:24

## 2019-01-01 RX ADMIN — FAMOTIDINE 20 MG: 20 TABLET ORAL at 21:27

## 2019-01-01 RX ADMIN — SERTRALINE HYDROCHLORIDE 50 MG: 50 TABLET ORAL at 08:12

## 2019-01-01 RX ADMIN — GLYBURIDE 5 MG: 5 TABLET ORAL at 08:12

## 2019-01-01 RX ADMIN — GEMFIBROZIL 600 MG: 600 TABLET ORAL at 08:12

## 2019-01-01 RX ADMIN — OLANZAPINE 10 MG: 10 TABLET ORAL at 16:10

## 2019-01-01 ASSESSMENT — ACTIVITIES OF DAILY LIVING (ADL)
DRESS: SCRUBS (BEHAVIORAL HEALTH);INDEPENDENT
HYGIENE/GROOMING: INDEPENDENT
ORAL_HYGIENE: INDEPENDENT
LAUNDRY: UNABLE TO COMPLETE
HYGIENE/GROOMING: INDEPENDENT
ORAL_HYGIENE: INDEPENDENT
DRESS: SCRUBS (BEHAVIORAL HEALTH);INDEPENDENT

## 2019-01-01 NOTE — PLAN OF CARE
Face to face end of shift report received from Janice MARQUES RN. Rounding completed. Patient observed.     Mariana Nicole  1/1/2019  7:33 AM

## 2019-01-01 NOTE — PROGRESS NOTES
"[facility]  Psychiatric Progress Note    Subjective   This is a 38 year old male with schizoaffective disorder, bipolar type, H/O intellectual disability, TBI, and H/O ETOH abuse.    Would not really speak with me. Repeatedly answered \"I don't know,\" while his face was in his pillow. Nursing reported that he only slept about 3 hours last night. He is unsure why. Later came to unit and spoke with the nurse. Requested double portions but denied wanting to speak with the provider about anything. Double portions not ordered secondary to weight status, diabetic issues, and Seroquel use.     10 point ROS negative other than what is noted in HPI       DIagnoses:    schizoaffective disorder, bipolar type  H/O intellectual disability  TIB  H/O ETOH abuse      Attestation:  Patient has been seen and evaluated by me,  Kiel Blair NP          Interim History:   The patient's care was discussed with the treatment team and chart notes were reviewed.          Medications:       busPIRone  10 mg Oral TID     famotidine  20 mg Oral BID     gabapentin  600 mg Oral TID     gemfibrozil  600 mg Oral BID AC     glyBURIDE  5 mg Oral BID w/meals    And     metFORMIN  500 mg Oral BID w/meals     lisinopril  5 mg Oral Daily     metoprolol tartrate  25 mg Oral BID     pantoprazole  20 mg Oral Daily     QUEtiapine  800 mg Oral At Bedtime     sertraline  50 mg Oral Daily     acetaminophen, albuterol, alum & mag hydroxide-simethicone, bisacodyl, glucose **OR** dextrose **OR** glucagon, hydrOXYzine, magnesium hydroxide, nicotine, OLANZapine **OR** OLANZapine, SUMAtriptan, traZODone          Allergies:     Allergies   Allergen Reactions     Penicillin G Hives     Tuna Flavor Anaphylaxis     Fish-Derived Products      Fish allergy, especially Perch.     No Clinical Screening - See Comments      Tuna and shell fish     Shellfish-Derived Products Itching     Penicillins Rash            Psychiatric Examination:   /62   Pulse " "90   Temp 97.6  F (36.4  C) (Tympanic)   Resp 16   Ht 1.778 m (5' 10\")   Wt 110.6 kg (243 lb 14.4 oz)   SpO2 96%   BMI 35.00 kg/m    Weight is 243 lbs 14.4 oz  Body mass index is 35 kg/m .    Appearance:  In bed, disheveled, face in pillow  Attitude: somewhat cooperative  Eye Contact:  poor  Mood:  fatiguede  Affect:  mood congruent  Speech:  Mumbled and distorted by pillow  Psychomotor Behavior:  no evidence of tardive dyskinesia, dystonia, or tics and intact station, gait and muscle tone  Thought Process:  Difficult to assess  Associations:  no loose associations  Thought Content:  no evidence of suicidal ideation or homicidal ideation and no evidence of psychotic thought  Insight:  fair  Judgment:  fair  Oriented to:  time, person, and place  Attention Span and Concentration:  intact  Recent and Remote Memory:  fair  Fund of Knowledge: low-normal  Muscle Strength and Tone: normal  Gait and Station: Normal  Perception: no perceptual disorder noted         Labs:   No results found for this or any previous visit (from the past 24 hour(s)).          Assessment/ Plan:   No changes. Double portions not ordered.     "

## 2019-01-01 NOTE — PLAN OF CARE
Adult Behavioral Health Plan of Care  Patient-Specific Goal (Individualization)  Description  Patient will maintain daily ADLs without prompting.  Patient will attend >50% of groups while on the unit.   Patient will agree to treatment team recommendations for med changes and aftercare treatment.   If pt states anxiety 1-5 he is to receive 50 mg Atarax, if pt states anxiety 5-10 he is to receive Zyprexa 10 mg per Jocelyne.    1/1/2019 0110 by Nataliia Garcia, RN  Note  0045 continues to sit up in the lobby and sleeps. Some snoring and some easy respirations. 0310 patient went to bed and sleep with c pap and one to one staff for safety, due to the cpap. 0524 patient got up for tylenol at about 0513 for generalized headache. 9/10. He was offered a snack and he said yes, eating a sandwich and then when asked he doesn't wanted to be woken up for early vitals. 0710 patient cpap removed, and patient returned back to sleep.      Suicide Risk  Absence of Self-Harm  Description  Patient will remain free from self harm while on unit.   Patient will report SI is manageable by discharge.    1/1/2019 0110 by Nataliia Garcia, RN  Note  0045 patient continues to sleep at this time in the lobby sitting up. He is aware that he has a cpap .

## 2019-01-01 NOTE — PLAN OF CARE
Adult Behavioral Health Plan of Care  Patient-Specific Goal (Individualization)  Description  Patient will maintain daily ADLs without prompting.  Patient will attend >50% of groups while on the unit.   Patient will agree to treatment team recommendations for med changes and aftercare treatment.   If pt states anxiety 1-5 he is to receive 50 mg Atarax, if pt states anxiety 5-10 he is to receive Zyprexa 10 mg per Jocelyne.    12/31/2018 2008 - No Change by Veronica Michelle, RN  Note  Pt was cooperative with nursing assessment.  He declined his metformin.  He said that he can feel if he is getting too high or low.  Pt  said that he feels anxious, but didn't want any medication.  That he has to not rely on medications.  Pt was encouraged to go to groups.  He did go for a little while.  He watched tv with peers.  Pt ate well.       Suicide Risk  Absence of Self-Harm  Description  Patient will remain free from self harm while on unit.   Patient will report SI is manageable by discharge.    12/31/2018 2008 - No Change by Veronica Michelle, RN  Note  Pt did not harm self this day shift.

## 2019-01-01 NOTE — PLAN OF CARE
Face to face end of shift report received from LUDWIG Nicole RN. Rounding completed. Patient observed, up on the unit.     Babatunde Garcia  1/1/2019  3:37 PM
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
## 2019-01-01 NOTE — PROGRESS NOTES
Face to face end of shift report received from lorraine buckner rn at 2300 report.. Rounding completed. Patient observed.     Nataliia Garcia  1/1/2019  1:12 AM

## 2019-01-02 PROCEDURE — 25000132 ZZH RX MED GY IP 250 OP 250 PS 637: Performed by: NURSE PRACTITIONER

## 2019-01-02 PROCEDURE — 12400000 ZZH R&B MH

## 2019-01-02 RX ORDER — GLYBURIDE 5 MG/1
5 TABLET ORAL 2 TIMES DAILY WITH MEALS
Qty: 60 TABLET | Refills: 0 | Status: SHIPPED | OUTPATIENT
Start: 2019-01-02 | End: 2019-02-01

## 2019-01-02 RX ORDER — BUSPIRONE HYDROCHLORIDE 10 MG/1
10 TABLET ORAL 3 TIMES DAILY
Qty: 90 TABLET | Refills: 0 | Status: SHIPPED | OUTPATIENT
Start: 2019-01-02 | End: 2019-02-01

## 2019-01-02 RX ORDER — GEMFIBROZIL 600 MG/1
600 TABLET, FILM COATED ORAL
Qty: 60 TABLET | Refills: 0 | Status: SHIPPED | OUTPATIENT
Start: 2019-01-02 | End: 2019-02-01

## 2019-01-02 RX ADMIN — BUSPIRONE HYDROCHLORIDE 10 MG: 10 TABLET ORAL at 08:24

## 2019-01-02 RX ADMIN — BUSPIRONE HYDROCHLORIDE 10 MG: 10 TABLET ORAL at 14:03

## 2019-01-02 RX ADMIN — GLYBURIDE 5 MG: 5 TABLET ORAL at 08:24

## 2019-01-02 RX ADMIN — SERTRALINE HYDROCHLORIDE 50 MG: 50 TABLET ORAL at 08:24

## 2019-01-02 RX ADMIN — METOPROLOL TARTRATE 25 MG: 25 TABLET, FILM COATED ORAL at 21:06

## 2019-01-02 RX ADMIN — GLYBURIDE 5 MG: 5 TABLET ORAL at 16:18

## 2019-01-02 RX ADMIN — GEMFIBROZIL 600 MG: 600 TABLET ORAL at 08:24

## 2019-01-02 RX ADMIN — FAMOTIDINE 20 MG: 20 TABLET ORAL at 21:06

## 2019-01-02 RX ADMIN — GEMFIBROZIL 600 MG: 600 TABLET ORAL at 16:18

## 2019-01-02 RX ADMIN — OLANZAPINE 10 MG: 10 TABLET ORAL at 11:29

## 2019-01-02 RX ADMIN — METOPROLOL TARTRATE 25 MG: 25 TABLET, FILM COATED ORAL at 08:24

## 2019-01-02 RX ADMIN — BUSPIRONE HYDROCHLORIDE 10 MG: 10 TABLET ORAL at 21:06

## 2019-01-02 RX ADMIN — GABAPENTIN 600 MG: 600 TABLET, FILM COATED ORAL at 21:06

## 2019-01-02 RX ADMIN — QUETIAPINE 800 MG: 200 TABLET, FILM COATED ORAL at 21:06

## 2019-01-02 RX ADMIN — SUMATRIPTAN 50 MG: 50 TABLET, FILM COATED ORAL at 12:14

## 2019-01-02 RX ADMIN — GABAPENTIN 600 MG: 600 TABLET, FILM COATED ORAL at 14:03

## 2019-01-02 RX ADMIN — GABAPENTIN 600 MG: 600 TABLET, FILM COATED ORAL at 08:24

## 2019-01-02 RX ADMIN — LISINOPRIL 5 MG: 5 TABLET ORAL at 08:24

## 2019-01-02 RX ADMIN — OLANZAPINE 10 MG: 10 TABLET ORAL at 16:18

## 2019-01-02 RX ADMIN — PANTOPRAZOLE SODIUM 20 MG: 20 TABLET, DELAYED RELEASE ORAL at 08:24

## 2019-01-02 RX ADMIN — FAMOTIDINE 20 MG: 20 TABLET ORAL at 08:24

## 2019-01-02 ASSESSMENT — ACTIVITIES OF DAILY LIVING (ADL)
DRESS: SCRUBS (BEHAVIORAL HEALTH);INDEPENDENT
HYGIENE/GROOMING: INDEPENDENT
DRESS: SCRUBS (BEHAVIORAL HEALTH);INDEPENDENT
ORAL_HYGIENE: INDEPENDENT
HYGIENE/GROOMING: INDEPENDENT
ORAL_HYGIENE: INDEPENDENT
LAUNDRY: UNABLE TO COMPLETE

## 2019-01-02 NOTE — PLAN OF CARE
Adult Behavioral Health Plan of Care  Patient-Specific Goal (Individualization)  Description  Patient will maintain daily ADLs without prompting.  Patient will attend >50% of groups while on the unit.   Patient will agree to treatment team recommendations for med changes and aftercare treatment.   If pt states anxiety 1-5 he is to receive 50 mg Atarax, if pt states anxiety 5-10 he is to receive Zyprexa 10 mg per Jocelyne.    1/2/2019 0048 by Nataliia Garcia, RN  Note  0030 in bed with cpap on and one to one staff present, with eyes closed and respirations easy. About 0415 up for water and then returned to bed and sleep with cpap. 0515 patient continues to sleep at this time.    Suicide Risk  Absence of Self-Harm  Description  Patient will remain free from self harm while on unit.   Patient will report SI is manageable by discharge.    1/2/2019 0048 by Nataliia Garcia, RN  Note  0030 in bed with easy respirations, cpap on and one to one staff present.

## 2019-01-02 NOTE — PROGRESS NOTES
Face to face end of shift report received from roger bucknre rn at 2300 report.. Rounding completed. Patient observed.     Nataliia Garcia  1/2/2019  12:50 AM

## 2019-01-02 NOTE — PLAN OF CARE
BEHAVIORAL TEAM DISCUSSION    Participants: Mini Madera NP, Mignon Stein LICSW, Rosa Cochran LSW,  Carmen Rubin LSW, Misty Caballero LGSW, Sana Baird RN, Veronica Blanton RN, Zeenat Britton RN, Beltran Walker RN, Amrita Garcia [facility], India Arthur OT, Jackelin Wynn OT  Progress: fair, appropriate on the unit  Continued Stay Criteria/Rationale: continue to stabilize, monitor for medication side effects and effectiveness  Medical/Physical: see problem list below  Precautions:   Behavioral Orders   Procedures    Code 1 - Restrict to Unit    Routine Programming     As clinically indicated    Self Injury Precaution    Status 15     Every 15 minutes.     Plan: committed through [facility]  Rationale for change in precautions or plan: none    Patient Active Problem List   Diagnosis    Suicidal ideation    Closed fracture of ankle    Asthma    Bipolar 1 disorder (H)    Central sleep apnea    Chronic mental illness    Dyslipidemia    Gastroesophageal reflux    Insomnia    Intellectual disability    JANIE on CPAP    Schizoaffective disorder (H)    Diabetes mellitus, type 2 (H)    Suicidal intent    Depression with suicidal ideation       Current Facility-Administered Medications:     acetaminophen (TYLENOL) tablet 650 mg, 650 mg, Oral, Q8H PRN, Jocelyne Barton APRN CNP, 650 mg at 01/01/19 0513    albuterol (PROAIR HFA/PROVENTIL HFA/VENTOLIN HFA) 108 (90 Base) MCG/ACT inhaler 2 puff, 2 puff, Inhalation, Q4H PRN, Elena Fajardo, NP    alum & mag hydroxide-simethicone (MYLANTA ES/MAALOX  ES) suspension 30 mL, 30 mL, Oral, Q4H PRN, Jody Sheets Mc, APRN CNP, 30 mL at 12/31/18 0114    bisacodyl (DULCOLAX) Suppository 10 mg, 10 mg, Rectal, Daily PRN, Jody Sheets Mc, APRN CNP    busPIRone (BUSPAR) tablet 10 mg, 10 mg, Oral, TID, Jocelyne Barton APRN CNP, 10 mg at 01/02/19 0824    glucose gel 15-30 g, 15-30 g, Oral, Q15 Min PRN **OR** dextrose 50 % injection 25-50 mL, 25-50 mL, Intravenous, Q15 Min PRN  **OR** glucagon injection 1 mg, 1 mg, Subcutaneous, Q15 Min PRN, Elena Fajardo, NP    famotidine (PEPCID) tablet 20 mg, 20 mg, Oral, BID, Elena Fajardo, NP, 20 mg at 01/02/19 0824    gabapentin (NEURONTIN) tablet 600 mg, 600 mg, Oral, TID, Jocelyne Barton APRN CNP, 600 mg at 01/02/19 0824    gemfibrozil (LOPID) tablet 600 mg, 600 mg, Oral, BID AC, Jocelyne Barton APRN CNP, 600 mg at 01/02/19 0824    glyBURIDE (DIABETA /MICRONASE) tablet 5 mg, 5 mg, Oral, BID w/meals, 5 mg at 01/02/19 0824 **AND** metFORMIN (GLUCOPHAGE) tablet 500 mg, 500 mg, Oral, BID w/meals, Elena Fajardo, NP, Stopped at 01/02/19 0824    hydrOXYzine (VISTARIL) capsule 50 mg, 50 mg, Oral, Q6H PRN, Jocelyne Barton APRN CNP, 50 mg at 12/31/18 0114    lisinopril (PRINIVIL/ZESTRIL) tablet 5 mg, 5 mg, Oral, Daily, Jocelyne Barton APRN CNP, 5 mg at 01/02/19 0824    magnesium hydroxide (MILK OF MAGNESIA) suspension 30 mL, 30 mL, Oral, At Bedtime PRN, Jody Sheets Mc, APRN CNP    metoprolol tartrate (LOPRESSOR) tablet 25 mg, 25 mg, Oral, BID, Elena Fajardo, NP, 25 mg at 01/02/19 0824    nicotine (NICORETTE) gum 2-4 mg, 2-4 mg, Buccal, Q2H PRN, Jody Sheets Mc, APRN CNP    OLANZapine (zyPREXA) tablet 10 mg, 10 mg, Oral, Q4H PRN, 10 mg at 01/01/19 1610 **OR** OLANZapine (zyPREXA) injection 10 mg, 10 mg, Intramuscular, Q4H PRN, Elena Fajardo, NP    pantoprazole (PROTONIX) EC tablet 20 mg, 20 mg, Oral, Daily, Jocelyne Barton APRN CNP, 20 mg at 01/02/19 0824    QUEtiapine (SEROquel) tablet 800 mg, 800 mg, Oral, At Bedtime, Jocelyne Barton APRN CNP, 800 mg at 01/01/19 2126    sertraline (ZOLOFT) tablet 50 mg, 50 mg, Oral, Daily, Jocelyne Barton APRN CNP, 50 mg at 01/02/19 0824    SUMAtriptan (IMITREX) tablet 50 mg, 50 mg, Oral, at onset of headache, Jocelyne Barton APRN CNP, 50 mg at 01/01/19 1145    traZODone (DESYREL) tablet 50 mg, 50 mg, Oral, At Bedtime PRN, Jody Sheets Mc, APRN CNP, 50 mg at 12/28/18 2117

## 2019-01-02 NOTE — DISCHARGE SUMMARY
"Psychiatric Discharge Summary    Ar Irby III MRN# [id number]   Age: 38 year old YOB: 1980     Date of Admission:  2018  Date of Discharge:  1/3/2019  Admitting Physician:  Juvencio Howard MD  Discharge Physician:  Mini Madera NP          Event Leading to Hospitalization and Hospital Stay     This patient is a 38 year old  male with a significant past psychiatric history of  schizoaffective disorder, bipolar type who presents with suicidal ideation with a plan to stab himself with a knife.Pt was on a provisional discharge of commitment though returned voluntarily and his  did not feel the need for a revocation.  Arrived here from [facility] in [place], [place].       Pt is on MI commitment through [facility]. He resides in a group home in [place] and reports that he needs to move and does not like. His primary reason is that others smoke pot in the house and he is trying to refrain from use. 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 Pt endorses symptoms of increased depression and \"hallucinations\" which he defines as \"seeing shadows\".  Pt goes on to report significant loss over the past several years \"it just really gets to me\". While here he was started on zoloft and has tolerated it well. He is was a bit blunted upon admmission and still appears to be a bit blunted in affect though has denied any further suicidal thoughts since his admission. He states \"i just want to get out of the group home im at and I know you cant help with this.  His  contacted our unit and reported he gets admitted frequently and she feels this is related to some malingering secondary to running out of his disability money. She states that there is going to be guardianship pursued in the near future due to his inability to care for himself and a large part of this is secondary to his intellectual " disability.            Our team has made contact with  to ensure readiness for discharge.     At time of discharge, there is no evidence that patient is in immediate danger of self or others.        DIagnoses:   schizoaffective disorder, bipolar type  H/O intellectual disability  TBI  H/o ETOH abuse               Labs:     Results for orders placed or performed during the hospital encounter of 04/24/18   CT Abdomen Pelvis w Contrast    Narrative    EXAMINATION: CT ABDOMEN PELVIS W CONTRAST, 4/24/2018 3:32 PM    TECHNIQUE:  Helical CT images from the lung bases through the  symphysis pubis were obtained  with IV contrast. Contrast dose: ISOVUE  300  100ML    COMPARISON: none    HISTORY: upper abdominal pain, bilateral;     FINDINGS:    There is dependent atelectasis at the lung bases.    The liver is free of masses or biliary ductal enlargement. No  calcified gallstones are seen.    The the spleen and pancreas appear normal.    The adrenal glands are normal.    The right and left kidneys are free of masses or hydronephrosis.    The periaortic lymph nodes are normal in caliber.    No intraperitoneal masses or inflammatory changes are noted. Appendix  was visualized and appears normal    In the pelvis the bladder and rectum appear normal. Just above the  bladder is a fatty density with a ring of calcification surrounding at  this may represent an area of fat necrosis in the pelvis. The regional  skeleton is intact      Impression    IMPRESSION: No intraperitoneal masses or inflammatory changes are  seen.     NICOLE POLANCO MD   Acetaminophen level   Result Value Ref Range    Acetaminophen Level <2 mg/L   Alcohol ethyl   Result Value Ref Range    Ethanol g/dL 0.03 (H) 0.01 g/dL   CBC with platelets differential   Result Value Ref Range    WBC 14.5 (H) 4.0 - 11.0 10e9/L    RBC Count 5.58 4.4 - 5.9 10e12/L    Hemoglobin 16.3 13.3 - 17.7 g/dL    Hematocrit 45.9 40.0 - 53.0 %    MCV 82 78 - 100 fl    MCH 29.2  26.5 - 33.0 pg    MCHC 35.5 31.5 - 36.5 g/dL    RDW 12.9 10.0 - 15.0 %    Platelet Count 338 150 - 450 10e9/L    Diff Method Automated Method     % Neutrophils 62.7 %    % Lymphocytes 27.7 %    % Monocytes 6.2 %    % Eosinophils 0.9 %    % Basophils 0.8 %    % Immature Granulocytes 1.7 %    Nucleated RBCs 0 0 /100    Absolute Neutrophil 9.1 (H) 1.6 - 8.3 10e9/L    Absolute Lymphocytes 4.0 0.8 - 5.3 10e9/L    Absolute Monocytes 0.9 0.0 - 1.3 10e9/L    Absolute Eosinophils 0.1 0.0 - 0.7 10e9/L    Absolute Basophils 0.1 0.0 - 0.2 10e9/L    Abs Immature Granulocytes 0.3 0 - 0.4 10e9/L    Absolute Nucleated RBC 0.0    Comprehensive metabolic panel   Result Value Ref Range    Sodium 131 (L) 133 - 144 mmol/L    Potassium 3.7 3.4 - 5.3 mmol/L    Chloride 97 94 - 109 mmol/L    Carbon Dioxide 16 (L) 20 - 32 mmol/L    Anion Gap 18 (H) 3 - 14 mmol/L    Glucose 427 (H) 70 - 99 mg/dL    Urea Nitrogen 6 (L) 7 - 30 mg/dL    Creatinine 0.67 0.66 - 1.25 mg/dL    GFR Estimate >90 >60 mL/min/1.7m2    GFR Estimate If Black >90 >60 mL/min/1.7m2    Calcium 8.3 (L) 8.5 - 10.1 mg/dL    Bilirubin Total 0.6 0.2 - 1.3 mg/dL    Albumin 4.1 3.4 - 5.0 g/dL    Protein Total 8.4 6.8 - 8.8 g/dL    Alkaline Phosphatase 117 40 - 150 U/L    ALT 70 0 - 70 U/L    AST 53 (H) 0 - 45 U/L   UA reflex to Microscopic and Culture   Result Value Ref Range    Color Urine Straw     Appearance Urine Clear     Glucose Urine >1000 (A) NEG^Negative mg/dL    Bilirubin Urine Negative NEG^Negative    Ketones Urine 40 (A) NEG^Negative mg/dL    Specific Gravity Urine 1.019 1.003 - 1.035    Blood Urine Trace (A) NEG^Negative    pH Urine 5.0 4.7 - 8.0 pH    Protein Albumin Urine Negative NEG^Negative mg/dL    Urobilinogen mg/dL Normal 0.0 - 2.0 mg/dL    Nitrite Urine Negative NEG^Negative    Leukocyte Esterase Urine Negative NEG^Negative    Source Midstream Urine     RBC Urine 1 0 - 2 /HPF    WBC Urine <1 0 - 5 /HPF    Bacteria Urine None (A) NEG^Negative /HPF   TSH   Result  Value Ref Range    TSH 0.94 0.40 - 4.00 mU/L   Salicylate level   Result Value Ref Range    Salicylate Level 2 mg/dL   Drug Screen Urine (Range)   Result Value Ref Range    Amphetamine Qual Urine Negative NEG^Negative    Barbiturates Qual Urine Negative NEG^Negative    Benzodiazepine Qual Urine Negative NEG^Negative    Cannabinoids Qual Urine Negative NEG^Negative    Cocaine Qual Urine Negative NEG^Negative    Opiates Qualitative Urine Negative NEG^Negative    Methadone Qual Urine Negative NEG^Negative    PCP Qual Urine Negative NEG^Negative   Lipase   Result Value Ref Range    Lipase 152 73 - 393 U/L   CRP inflammation   Result Value Ref Range    CRP Inflammation 7.0 0.0 - 8.0 mg/L   Ketone Beta-Hydroxybutyrate Quantitative   Result Value Ref Range    Ketone Quantitative 0.8 (H) 0.0 - 0.6 mmol/L   Blood gas venous with oxyhemoglobin   Result Value Ref Range    Ph Venous 7.40 7.32 - 7.43 pH    PCO2 Venous 36 (L) 40 - 50 mm Hg    PO2 Venous 93 (H) 25 - 47 mm Hg    Bicarbonate Venous 21 21 - 28 mmol/L    FIO2 21%     Oxyhemoglobin Venous 95 %    Base Deficit Venous 2.3 mmol/L   Glucose by meter   Result Value Ref Range    Glucose 268 (H) 70 - 99 mg/dL   Comprehensive metabolic panel   Result Value Ref Range    Sodium 137 133 - 144 mmol/L    Potassium 3.8 3.4 - 5.3 mmol/L    Chloride 105 94 - 109 mmol/L    Carbon Dioxide 22 20 - 32 mmol/L    Anion Gap 10 3 - 14 mmol/L    Glucose 243 (H) 70 - 99 mg/dL    Urea Nitrogen 6 (L) 7 - 30 mg/dL    Creatinine 0.59 (L) 0.66 - 1.25 mg/dL    GFR Estimate >90 >60 mL/min/1.7m2    GFR Estimate If Black >90 >60 mL/min/1.7m2    Calcium 8.0 (L) 8.5 - 10.1 mg/dL    Bilirubin Total 0.6 0.2 - 1.3 mg/dL    Albumin 3.6 3.4 - 5.0 g/dL    Protein Total 7.1 6.8 - 8.8 g/dL    Alkaline Phosphatase 96 40 - 150 U/L    ALT 57 0 - 70 U/L    AST 43 0 - 45 U/L   Ketone Beta-Hydroxybutyrate Quantitative   Result Value Ref Range    Ketone Quantitative 0.8 (H) 0.0 - 0.6 mmol/L   Glucose by meter   Result  Value Ref Range    Glucose 212 (H) 70 - 99 mg/dL   Glucose by meter   Result Value Ref Range    Glucose 163 (H) 70 - 99 mg/dL   Basic metabolic panel   Result Value Ref Range    Sodium 139 133 - 144 mmol/L    Potassium 3.6 3.4 - 5.3 mmol/L    Chloride 105 94 - 109 mmol/L    Carbon Dioxide 23 20 - 32 mmol/L    Anion Gap 11 3 - 14 mmol/L    Glucose 262 (H) 70 - 99 mg/dL    Urea Nitrogen 11 7 - 30 mg/dL    Creatinine 0.71 0.66 - 1.25 mg/dL    GFR Estimate >90 >60 mL/min/1.7m2    GFR Estimate If Black >90 >60 mL/min/1.7m2    Calcium 8.2 (L) 8.5 - 10.1 mg/dL   Ketone Beta-Hydroxybutyrate Quantitative   Result Value Ref Range    Ketone Quantitative 0.4 0.0 - 0.6 mmol/L   Glucose by meter   Result Value Ref Range    Glucose 255 (H) 70 - 99 mg/dL   Glucose by meter   Result Value Ref Range    Glucose 349 (H) 70 - 99 mg/dL   Glucose by meter   Result Value Ref Range    Glucose 311 (H) 70 - 99 mg/dL   Glucose by meter   Result Value Ref Range    Glucose 300 (H) 70 - 99 mg/dL   Glucose by meter   Result Value Ref Range    Glucose 330 (H) 70 - 99 mg/dL   Glucose by meter   Result Value Ref Range    Glucose 290 (H) 70 - 99 mg/dL   Glucose by meter   Result Value Ref Range    Glucose 278 (H) 70 - 99 mg/dL   Glucose by meter   Result Value Ref Range    Glucose 356 (H) 70 - 99 mg/dL   Glucose by meter   Result Value Ref Range    Glucose 341 (H) 70 - 99 mg/dL   Glucose by meter   Result Value Ref Range    Glucose 297 (H) 70 - 99 mg/dL   Glucose by meter   Result Value Ref Range    Glucose 321 (H) 70 - 99 mg/dL   Glucose by meter   Result Value Ref Range    Glucose 317 (H) 70 - 99 mg/dL   Glucose by meter   Result Value Ref Range    Glucose 340 (H) 70 - 99 mg/dL   Glucose by meter   Result Value Ref Range    Glucose 314 (H) 70 - 99 mg/dL   Glucose by meter   Result Value Ref Range    Glucose 388 (H) 70 - 99 mg/dL   Glucose by meter   Result Value Ref Range    Glucose 302 (H) 70 - 99 mg/dL   Glucose by meter   Result Value Ref Range     Glucose 306 (H) 70 - 99 mg/dL                    Discharge Medications:     Current Discharge Medication List      START taking these medications    Details   glyBURIDE (DIABETA /MICRONASE) 5 MG tablet Take 1 tablet (5 mg) by mouth 2 times daily (with meals)  Qty: 60 tablet, Refills: 0    Associated Diagnoses: Depression with suicidal ideation      metFORMIN (GLUCOPHAGE) 500 MG tablet Take 1 tablet (500 mg) by mouth 2 times daily (with meals)  Qty: 60 tablet, Refills: 0    Associated Diagnoses: Depression with suicidal ideation         CONTINUE these medications which have CHANGED    Details   busPIRone (BUSPAR) 10 MG tablet Take 1 tablet (10 mg) by mouth 3 times daily  Qty: 90 tablet, Refills: 0    Associated Diagnoses: Depression with suicidal ideation      gemfibrozil (LOPID) 600 MG tablet Take 1 tablet (600 mg) by mouth 2 times daily (before meals)  Qty: 60 tablet, Refills: 0    Associated Diagnoses: Depression with suicidal ideation         CONTINUE these medications which have NOT CHANGED    Details   albuterol (VENTOLIN HFA) 108 (90 Base) MCG/ACT Inhaler Inhale 2 puffs into the lungs every 4 hours as needed for shortness of breath / dyspnea or wheezing Every 4-6 hours as needed  Qty: 1 Inhaler, Refills: 0    Associated Diagnoses: Intermittent asthma without complication, unspecified asthma severity      dexlansoprazole (DEXILANT) 30 MG CPDR CR capsule Take 30 mg by mouth daily      famotidine (PEPCID) 20 MG tablet Take 20 mg by mouth 2 times daily      gabapentin (NEURONTIN) 600 MG tablet Take 600 mg by mouth 3 times daily      lisinopril (PRINIVIL/ZESTRIL) 5 MG tablet Take 5 mg by mouth daily      metoprolol tartrate (LOPRESSOR) 25 MG tablet Take 25 mg by mouth 2 times daily      nicotine polacrilex (NICORETTE) 4 MG gum Place 4 mg inside cheek nightly as needed for smoking cessation      QUEtiapine (SEROQUEL) 400 MG tablet Take 2 tablets (800 mg) by mouth At Bedtime  Qty: 60 tablet, Refills: 0     Associated Diagnoses: Bipolar 1 disorder (H)      sertraline (ZOLOFT) 50 MG tablet Take 50 mg by mouth daily      SUMAtriptan (IMITREX) 25 MG tablet Take 2 tablets (50 mg) by mouth at onset of headache Max two doses in 24 hour period  Qty: 14 tablet, Refills: 0    Associated Diagnoses: Intractable headache, unspecified chronicity pattern, unspecified headache type         STOP taking these medications       acetaminophen (TYLENOL) 325 MG tablet Comments:   Reason for Stopping:         glyBURIDE-metFORMIN (GLUCOVANCE) 5-500 MG tablet Comments:   Reason for Stopping:         hydrOXYzine (VISTARIL) 50 MG capsule Comments:   Reason for Stopping:                      Psychiatric Examination:   Appearance:  appeared older than stated age  Attitude:  cooperative  Eye Contact:  fair  Mood:  flat   Affect:  intensity is blunted  Speech:  decreased prosody  Psychomotor Behavior:  no evidence of tardive dyskinesia, dystonia, or tics  Thought Process:  goal oriented though concrete  Associations:  no loose associations  Thought Content:  no evidence of suicidal ideation or homicidal ideation and no evidence of psychotic thought  Insight:  limited  Judgment:  fair  Oriented to:  time, person, and place  Attention Span and Concentration:  limited  Recent and Remote Memory:  limited  Fund of Knowledge: delayed  Muscle Strength and Tone: normal  Gait and Station: Normal  Perception:        Discharge Plan:       He is still provisionally discharged from his commitment and was not revoked. He is returning back to the group home he was at and gaurdianship is being persued.    Attestation:  The patient has been seen and evaluated by me,  Mini Madera NP         Discharge Services Provided:    35 minutes spent on discharge services, including:  Final examination of patient.  Review and discussion of Hospital stay.  Instructions for continued outpatient care/goals.  Preparation of discharge records.  Preparation of  medications refills and new prescriptions.  Preparation of Applicable referral forms.

## 2019-01-02 NOTE — PLAN OF CARE
Face to face end of shift report received from LUDWIG Nicole RN. Rounding completed. Patient observed, up on the unit.     Babatunde Garcia  1/2/2019  3:38 PM

## 2019-01-02 NOTE — PLAN OF CARE
Adult Behavioral Health Plan of Care  Patient-Specific Goal (Individualization)  Description  Patient will maintain daily ADLs without prompting.  Patient will attend >50% of groups while on the unit.   Patient will agree to treatment team recommendations for med changes and aftercare treatment.   If pt states anxiety 1-5 he is to receive 50 mg Atarax, if pt states anxiety 5-10 he is to receive Zyprexa 10 mg per Jocelyne.    1/2/2019 0833 - Improving by Mariana Nicole RN    Pt denied SI, SIB, HI, hallucinations, delusions, and pain. Pt endorsed anxiety and depression - denied PRN at this time and did not rate this. Pt's speech is slow at times and pt is slow to respond at times, pt reported that he did sleep well last night. Pt's affect was blunted, flat, mood is calm. Pt up on the unit for breakfast and ate 100% of his breakfast. Pt was cooperative with this nursing assessment, compliant with medication administration, and appropriate with peers/staff this shift. No other complaints offered at this time - will continue to monitor.  Pt requested and received Zyprexa 10 mg PO at 1130 d/t reported anxiety rated at a 9/10.    Pt requested and received Imitrex 50 mg PO at 1214 for reported headache pain -  Upon reassessment, pt stated that his pain decreased.     Suicide Risk  Absence of Self-Harm  Description  Patient will remain free from self harm while on unit.   Patient will report SI is manageable by discharge.      Pt was free from self-harm this shift and did deny SI and SIB to this writer this shift.  1/2/2019 0833 - Improving by Mariana Nicole, WERNER

## 2019-01-02 NOTE — PLAN OF CARE
Face to face end of shift report received from Janice MARQUES RN. Rounding completed. Patient observed.     Mariana Nicole  1/2/2019  7:22 AM

## 2019-01-02 NOTE — PLAN OF CARE
Spoke to pt this afternoon. Informed him that his CM will not be revoking him and that he will be retuning back to his [facility]. Pt was relieved that he was not being revoked. Pt did ask about a 90 day program, informed him that is not something we would coordinate form here, but he could talk to him CM about it. Pt would like to discharge back to his  tomorrow.     Writer spoke to NP and informed her of this. Writer will also contact  about pt returning tomorrow.

## 2019-01-02 NOTE — PLAN OF CARE
Adult Behavioral Health Plan of Care  Patient-Specific Goal (Individualization)  Description  Patient will maintain daily ADLs without prompting.  Patient will attend >50% of groups while on the unit.   Patient will agree to treatment team recommendations for med changes and aftercare treatment.   If pt states anxiety 1-5 he is to receive 50 mg Atarax, if pt states anxiety 5-10 he is to receive Zyprexa 10 mg per Jocelyne.    1/1/2019 2036 - No Change by Babatunde Garcia RN  Pt has been up on the unit all shift. Does not attend groups. Socializes with peers. Denied pain on assessment. Reported anxiety, rated 8/10. PRN Zyprexa 10 mg PO given at approximately 1610 with good effect. Pt has been extremely talkative with staff--laughing and joking. Admitted to feeling depressed, but denied SI. Ate 100% of supper meal. Presents as somewhat disheveled and unkempt appearing.     Suicide Risk  Absence of Self-Harm  Description  Patient will remain free from self harm while on unit.   Patient will report SI is manageable by discharge.    1/1/2019 2036 - No Change by Babatunde Garcia, RN  Pt has remained free from self-harm and/or injury this shift.

## 2019-01-02 NOTE — PLAN OF CARE
Pt is able to return to [facility] tomorrow. Spoke to house supervisor and they would prefer if pt could return after 12:00. Pt will be taking a [employer] ride as [facility] is shot staff tomorrow and cannot transport.     Medications will need to be sent to [facility] in [place]

## 2019-01-02 NOTE — PLAN OF CARE
Spoke to pt's . She is not going to be revoking pt at this time due to this being a reoccurring thing for pt. CM stated pt likes being in the hospital and manipulate staff on a regular basis. CM stated they took over his finances in the past but was able to find a psychologist to sign off on his ability to manage his own - leading to him getting his social security checks, spending it all in a week or 2 and then stating he's suicidal and staying in the hospital until the beginning of the next month.     CM stated he will return to  when able

## 2019-01-03 VITALS
TEMPERATURE: 98.2 F | BODY MASS INDEX: 34.92 KG/M2 | WEIGHT: 243.9 LBS | HEART RATE: 75 BPM | RESPIRATION RATE: 12 BRPM | DIASTOLIC BLOOD PRESSURE: 66 MMHG | OXYGEN SATURATION: 95 % | HEIGHT: 70 IN | SYSTOLIC BLOOD PRESSURE: 123 MMHG

## 2019-01-03 PROCEDURE — 25000132 ZZH RX MED GY IP 250 OP 250 PS 637: Performed by: NURSE PRACTITIONER

## 2019-01-03 PROCEDURE — 99239 HOSP IP/OBS DSCHRG MGMT >30: CPT | Performed by: NURSE PRACTITIONER

## 2019-01-03 RX ADMIN — LISINOPRIL 5 MG: 5 TABLET ORAL at 08:25

## 2019-01-03 RX ADMIN — GABAPENTIN 600 MG: 600 TABLET, FILM COATED ORAL at 08:26

## 2019-01-03 RX ADMIN — METOPROLOL TARTRATE 25 MG: 25 TABLET, FILM COATED ORAL at 08:27

## 2019-01-03 RX ADMIN — GLYBURIDE 5 MG: 5 TABLET ORAL at 08:25

## 2019-01-03 RX ADMIN — METFORMIN HYDROCHLORIDE 500 MG: 500 TABLET ORAL at 08:27

## 2019-01-03 RX ADMIN — BUSPIRONE HYDROCHLORIDE 10 MG: 10 TABLET ORAL at 08:25

## 2019-01-03 RX ADMIN — OLANZAPINE 10 MG: 10 TABLET ORAL at 08:29

## 2019-01-03 RX ADMIN — PANTOPRAZOLE SODIUM 20 MG: 20 TABLET, DELAYED RELEASE ORAL at 08:27

## 2019-01-03 RX ADMIN — FAMOTIDINE 20 MG: 20 TABLET ORAL at 08:27

## 2019-01-03 RX ADMIN — SERTRALINE HYDROCHLORIDE 50 MG: 50 TABLET ORAL at 08:25

## 2019-01-03 RX ADMIN — GEMFIBROZIL 600 MG: 600 TABLET ORAL at 08:26

## 2019-01-03 ASSESSMENT — ACTIVITIES OF DAILY LIVING (ADL)
HYGIENE/GROOMING: INDEPENDENT
LAUNDRY: UNABLE TO COMPLETE
ORAL_HYGIENE: INDEPENDENT
DRESS: SCRUBS (BEHAVIORAL HEALTH)

## 2019-01-03 NOTE — PLAN OF CARE
Pt is discharging at the recommendation of the treatment team. Pt is discharging back to his group home,  transported by an insurance ride. Pt denies having any thoughts of hurting themself or anyone else. Pt denies anxiety or depression. Pt has follow up with Therapy and Med Management. Discharge instructions, including; demographic sheet, psychiatric evaluation, discharge summary, and AVS were faxed to these next level of care providers.     Pt will be picked up at 11:15 by [employer]     [employer]: [phone]

## 2019-01-03 NOTE — PROGRESS NOTES
Face to face end of shift report received from roger buckner rn at 2300 report.. Rounding completed. Patient observed.     Nataliia Garcia  1/3/2019  12:23 AM

## 2019-01-03 NOTE — DISCHARGE INSTRUCTIONS
Behavioral Discharge Planning and Instructions    Summary: Pt was admitted due to increased SI and a plan to stab himself with a knife.    Main Diagnosis:   schizoaffective disorder, bipolar type  H/O intellectual disability  TBI  H/o ETOH abuse    Major Treatments, Procedures and Findings: Stabilize with medications, connect with community programs.    Symptoms to Report: feeling more aggressive, increased confusion, losing more sleep, mood getting worse or thoughts of suicide    Lifestyle Adjustment: Take all medications as prescribed, meet with doctor/ medication provider, out patient therapist, , and [facility] worker as scheduled. Abstain from alcohol or any unprescribed drugs.    Psychiatry Follow-up:     [facility]   [facility]  - Sakshi Red   [phone] Ext. [phone]  [address]  [place] [address]  Phone: [phone]  Fax: [phone]    [facility]   Primary Doctor - Libra Crouch   [phone]    [employer]  Med Management - Britta Noble - as previously arranged   Therapy - Mignon - as previously arranged   [address]  [place] [address]  Phone: [phone]  Fax: [phone]    [facility]  House Supervisor - Elly Waddell   Phone: [phone]  Fax: [phone]    Resources:   Crisis Intervention: 866.100.3053 or 366-453-0328 (TTY: 891.628.9721).  Call anytime for help.  National [place] on Mental Illness (www.[place].nagi.org): 102.815.7494 or 643-027-0963.  Alcoholics Anonymous (www.alcoholics-anonymous.org): Check your phone book for your local chapter.  Suicide Awareness Voices of Education (SAVE) (www.save.org): 926-720-NKCK (1706)  National Suicide Prevention Line (www.mentalhealthmn.org): 546-408-BSOX (1515)  Mental Health Consumer/Survivor Network of [place] (www.mhcsn.net): 579.620.7000 or 540-947-1449  Mental Health Association [facility]  "(www.mentalhealth.org): 744.175.6178 or 876-632-0282    [place] Area:  [facility], Crisis stabilization [facility]- 723.863.7195  [facility] Crisis Line: 1-704.435.9837  [employer]ates For Family Peace: 800-2771  Sexual Assault Program of [facility]: 529.952.9551 or 1-105.247.4699  [place] [facility]attered [facility] Program: 4-839-193-8907 Ext: 279       Calls answered Mon-Fri-8:00 am--4:30 pm    [place]:  [employer] for Family Peace: 6-895-234-0312  [facility] first call for help: 6-497-140-1050  [facility]:  (608) 242-3887      [place] Area:  Warm Line: 1-431.421.2383       Calls answered Tuesday--Saturday 4:00 pm--10:00 pm  [facility] Crisis Line - 430.732.6316  [facility] 035-619-1865    [place] Statewide:  [place] Crisis and Referral Services: 1-265-501-0525  National Suicide Prevention Lifeline: 9-499-767-TALK (4384)   - gzn4zqqk- Text \"Life\" to 93055  First Call for Help: 2-1-1  [facility] Helpline- 0-383-ANFW-HELP    General Medication Instructions:   See your medication sheet(s) for instructions.   Take all medicines as directed.  Make no changes unless your doctor suggests them.   Go to all your doctor visits.  Be sure to have all your required lab tests. This way, your medicines can be refilled on time.  Do not use any drugs not prescribed by your doctor.  Avoid alcohol.    "

## 2019-01-03 NOTE — PLAN OF CARE
Adult Behavioral Health Plan of Care  Patient-Specific Goal (Individualization)  Description  Patient will maintain daily ADLs without prompting.  Patient will attend >50% of groups while on the unit.   Patient will agree to treatment team recommendations for med changes and aftercare treatment.   If pt states anxiety 1-5 he is to receive 50 mg Atarax, if pt states anxiety 5-10 he is to receive Zyprexa 10 mg per Jocelyne.    1/2/2019 2132 - No Change by Babatunde Garcia RN  Pt has been up and about on the unit all shift. Does not attend groups. Remains pleasant and cooperative. Reported continued depression and anxiety, but denied SI. Denied pain. Ate 100% of supper meal. Reported that he slept well last night, but charting indicates that pt slept approximately 4.5 hours. Pt uses a CPAP at night, with 1:1 staff present for safety. Pt's father visited this evening, but did not take pt's debit card (as was previously approved by treatment team). Remains somewhat unkempt appearing.     Suicide Risk  Absence of Self-Harm  Description  Patient will remain free from self harm while on unit.   Patient will report SI is manageable by discharge.    1/2/2019 2132 - No Change by Babatunde Garcia RN  Reported continued depression and anxiety, but denied SI. Has remained free from self-harm and/or injury this shift.

## 2019-01-03 NOTE — PLAN OF CARE
Face to face end of shift report received from Janice MARQUES RN. Rounding completed. Patient observed sleeping in bed.    Magali Howard  1/3/2019  7:51 AM

## 2019-01-03 NOTE — PLAN OF CARE
Adult Behavioral Health Plan of Care  Patient-Specific Goal (Individualization)  Description  Patient will maintain daily ADLs without prompting.  Patient will attend >50% of groups while on the unit.   Patient will agree to treatment team recommendations for med changes and aftercare treatment.   If pt states anxiety 1-5 he is to receive 50 mg Atarax, if pt states anxiety 5-10 he is to receive Zyprexa 10 mg per Jocelyne.    1/3/2019 0021 by Nataliia Garcia, RN  Note  0015 patient is up in the lobby. Offered and given snack. He is quietly sitting in lobby. Has no needs otherwise at this time. Will have one to one staff when he is ready for cpap.0115 patient went to be with cpap on and one to one staff. 0519 patient continues to sleep at this time.     Suicide Risk  Absence of Self-Harm  Description  Patient will remain free from self harm while on unit.   Patient will report SI is manageable by discharge.    1/3/2019 0021 by Nataliia Garcia, RN  Note  0015 in [facility] quietly sitting.

## 2020-01-31 NOTE — PLAN OF CARE
[facility]   Brief Operative Note    Pre-operative diagnosis: Special screening for malignant neoplasms, colon [Z12.11]   Post-operative diagnosis normal colon     Procedure: Procedure(s):  COLONOSCOPY   Surgeon(s): Surgeon(s) and Role:     * Ronal North MD - Primary   Estimated blood loss: * No values recorded between 1/31/2020 12:00 AM and 1/31/2020  9:31 AM *    Specimens: * No specimens in log *   Findings: Normal colon              Problem: Patient Care Overview  Goal: Individualization & Mutuality  Patient will report managed anxiety and decreased depression prior to discharge.   Patient will report at least 6-8 hours of sleep each night.   Patient will be compliant with treatment team recommendations.    Patient in lounge sleeping in chair majority of shift. Woke for snack, received PRN Trazodone 50 mg at 0243, then returned to snoring loudly in lounge.   Received PRN Tylenol 650 mg at 0624 for generalized pain 7/10.

## 2024-01-30 NOTE — PLAN OF CARE
Discharge Note    Patient Discharged to rehabilitation facility on 1/3/2019 11:33  AM via Private Car accompanied by transport staff.     Patient informed of discharge instructions in AVS. patient verbalizes understanding and denies having any questions pertaining to AVS. Patient stable at time of discharge. Patient denies SI, HI, and thoughts of self harm at time of discharge. All personal belongings returned to patient. Discharge prescriptions sent to [facility] [place] via escribed. Psych evaluation, history and physical, AVS, and discharge summary faxed to next level of care- per .     Magali Howard  1/3/2019  11:51 AM     no